# Patient Record
Sex: FEMALE | Race: WHITE
[De-identification: names, ages, dates, MRNs, and addresses within clinical notes are randomized per-mention and may not be internally consistent; named-entity substitution may affect disease eponyms.]

---

## 2017-09-16 ENCOUNTER — HOSPITAL ENCOUNTER (EMERGENCY)
Dept: HOSPITAL 25 - UCCORT | Age: 67
Discharge: HOME | End: 2017-09-16
Payer: MEDICARE

## 2017-09-16 VITALS — SYSTOLIC BLOOD PRESSURE: 124 MMHG | DIASTOLIC BLOOD PRESSURE: 65 MMHG

## 2017-09-16 DIAGNOSIS — J30.9: ICD-10-CM

## 2017-09-16 DIAGNOSIS — E11.9: ICD-10-CM

## 2017-09-16 DIAGNOSIS — Z91.048: ICD-10-CM

## 2017-09-16 DIAGNOSIS — H69.90: ICD-10-CM

## 2017-09-16 DIAGNOSIS — Z88.8: ICD-10-CM

## 2017-09-16 DIAGNOSIS — S50.02XA: Primary | ICD-10-CM

## 2017-09-16 DIAGNOSIS — F41.9: ICD-10-CM

## 2017-09-16 DIAGNOSIS — M25.521: ICD-10-CM
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
## 2017-09-16 PROCEDURE — 99211 OFF/OP EST MAY X REQ PHY/QHP: CPT

## 2017-09-16 PROCEDURE — G0463 HOSPITAL OUTPT CLINIC VISIT: HCPCS

## 2017-09-16 NOTE — UC
Elbow Pain





- HPI Summary


HPI Summary: 


Fell on 9/11/17 onto left elbow. Persistent pain with abrasion and wound 

drainage.


Also c/o ear pain in the last 2 weeks.





- History of Current Complaint


Chief Complaint: UCUpperExtremity


Stated Complaint: LEFT ELBOW SKIN CONCERN


Time Seen by Provider: 09/16/17 14:38


Hx Obtained From: Patient


Hx Last Menstrual Period: n/a


Pregnant?: No


Onset/Duration: Days - 4 days ago, Traumatic - fall onto the left elbow, Still 

Present


Severity Initially: Moderate


Severity Currently: Moderate


Location Of Pain: Is Discrete @ - left elbow


Character: Aching


Aggravating Factor(s): Movement


Alleviating Factor(s): Rest


Associated Signs And Symptoms: Positive: Negative





- Allergies/Home Medications


Allergies/Adverse Reactions: 


 Allergies











Allergy/AdvReac Type Severity Reaction Status Date / Time


 


Adhesive Tape Allergy  ITCHINESS Verified 09/16/17 14:06


 


Pregabalin [From Lyrica] AdvReac Intermediate DEPRESSION Verified 09/16/17 14:06


 


Meloxicam [From Mobic] AdvReac  Nausea And Verified 09/16/17 14:06





   Vomiting  


 


ENVIRONMENTAL Allergy  Unknown Uncoded 09/16/17 14:06





   Reaction  





   Details  











Home Medications: 


 Home Medications





ARIPiprazole TAB* [Abilify 2 MG TAB*] 5 mg DAILY 09/16/17 [History Confirmed 09/ 16/17]


Cholecalciferol [Vitamin D] 3,000 unit DAILY 09/16/17 [History Confirmed 09/16/ 17]


Enalapril TAB* [Vasotec TAB*] 1 tab DAILY 09/16/17 [History Confirmed 09/16/17]


Magnesium [Magnesium Elemental] 1 tab DAILY 09/16/17 [History Confirmed 09/16/17

]


Sertraline* [Zoloft*] 1 tab QAM 09/16/17 [History Confirmed 09/16/17]


clonazePAM TAB(*) [Klonopin TAB(*)] 1 tab TID 09/16/17 [History Confirmed 09/16/ 17]











PMH/Surg Hx/FS Hx/Imm Hx


Endocrine History: Diabetes


Cardiovascular History: Hypertension


Respiratory History: Asthma


Psychological History: Anxiety





- Surgical History


Surgical History: Yes


Surgery Procedure, Year, and Place: C-SECTIONS2-RIGHT HAND CARPAL TUNNEL. 

BILATERAL OVARIES REMOVED.  T&A.  D&Cs.  BILATERAL BREAST LUMPECTOMIES.  LASER 

SX L EYE-FLOATERS.  SURGERIES  IN [place], [place] AND [place]





- Family History


Known Family History: Positive: Cardiac Disease, Hypertension, Respiratory 

Disease


   Negative: Diabetes, Seizure Disorder





- Social History


Occupation: Employed Part-time


Lives: Alone


Alcohol Use: None


Substance Use Type: None


Smoking Status (MU): Never Smoked Tobacco


Have You Smoked in the Last Year: No





- Immunization History


Most Recent Influenza Vaccination: NONE 2017


Most Recent Tetanus Shot: 2010


Most Recent Pneumonia Vaccination: Sep 2017





Review of Systems


ENT: Ear Ache, Nasal Discharge


Musculoskeletal: Arthralgia


Is Patient Immunocompromised?: No


All Other Systems Reviewed And Are Negative: Yes





Physical Exam


Triage Information Reviewed: Yes


Appearance: Well-Appearing, No Pain Distress, Well-Nourished


Vital Signs: 


 Initial Vital Signs











Temp  97.3 F   09/16/17 14:11


 


Pulse  53   09/16/17 14:11


 


Resp  18   09/16/17 14:11


 


BP  124/65   09/16/17 14:11


 


Pulse Ox  97   09/16/17 14:11











Vital Signs Reviewed: Yes


Eyes: Positive: Conjunctiva Clear


ENT: Positive: Pharynx normal, Nasal congestion, TMs normal


Neck exam: Normal


Respiratory Exam: Normal


Cardiovascular Exam: Normal


Musculoskeletal Exam: Normal


Neurological Exam: Normal


Psychological Exam: Normal


Skin: Positive: Other - abrasion left extensor elbow.





Elbow Pain Course/Dx





- Differential Dx/Diagnosis


Differential Diagnosis/HQI/PQRI: Abrasion, Contusion, Fracture (Closed)


Provider Diagnoses: abrasion left arm.  Contusion left elbow.  Allergic 

rhinitis.  Eustachian tube dysfunction.





Discharge





- Discharge Plan


Condition: Stable


Disposition: HOME


Patient Education Materials:  Abrasion (ED), Contusion in Adults (ED), Allergic 

Rhinitis (ED)


Additional Instructions: 


NEILMED SINUS RINSE: CHECK OUT AT [email or web address]





Saline nasal wash helps with mucous, allergies and congestion. It can be used 

up to twice a day or only as needed.


Use lukewarm tap water. It does not have to be sterilized or distilled water.


Do 1/3 on each side and snort out of both nostrils. Repeat the process with 1/6 

of the bottle on each side with snorting in between to finish the solution in 

the bottle

## 2018-06-04 ENCOUNTER — HOSPITAL ENCOUNTER (EMERGENCY)
Dept: HOSPITAL 25 - UCCORT | Age: 68
Discharge: HOME | End: 2018-06-04
Payer: MEDICARE

## 2018-06-04 VITALS — DIASTOLIC BLOOD PRESSURE: 68 MMHG | SYSTOLIC BLOOD PRESSURE: 128 MMHG

## 2018-06-04 DIAGNOSIS — M19.90: ICD-10-CM

## 2018-06-04 DIAGNOSIS — J44.9: ICD-10-CM

## 2018-06-04 DIAGNOSIS — Z95.0: ICD-10-CM

## 2018-06-04 DIAGNOSIS — K21.9: ICD-10-CM

## 2018-06-04 DIAGNOSIS — M81.0: ICD-10-CM

## 2018-06-04 DIAGNOSIS — I10: ICD-10-CM

## 2018-06-04 DIAGNOSIS — R60.0: Primary | ICD-10-CM

## 2018-06-04 PROCEDURE — G0463 HOSPITAL OUTPT CLINIC VISIT: HCPCS

## 2018-06-04 PROCEDURE — 99212 OFFICE O/P EST SF 10 MIN: CPT

## 2018-06-04 NOTE — ED
Lower Extremity





- HPI Summary


HPI Summary: 





67 yr old female with the complaint of right lower extremity swelling, itching.

  The patient banged her anterior right lower leg on a cart at [employer] two weeks 

ago, and over the past week has experienced swelling and itching to the lower 

leg, and at times has redness to the lower anterior leg.  No fever, chills.  No 

other complaints.





- History of Current Complaint


Chief Complaint: UCSkin


Stated Complaint: SKIN COMPLAINT - RT LEG


Time Seen by Provider: 06/04/18 17:03


Hx Last Menstrual Period: n/a


Pain Intensity: 0





- Allergies/Home Medications


Allergies/Adverse Reactions: 


 Allergies











Allergy/AdvReac Type Severity Reaction Status Date / Time


 


Adhesive Tape Allergy  ITCHINESS Verified 09/16/17 14:06


 


MS Pregabalin [From Lyrica] AdvReac Intermediate DEPRESSION Verified 09/16/17 14

:06


 


MS Meloxicam [From Mobic] AdvReac  Nausea And Verified 09/16/17 14:06





   Vomiting  


 


ENVIRONMENTAL Allergy  Unknown Uncoded 09/16/17 14:06





   Reaction  





   Details  











Home Medications: 


 Home Medications





Mirtazapine TAB* [Remeron TAB*] 30 mg PO BEDTIME 06/04/18 [History Confirmed 06/ 04/18]


Pantoprazole TAB (NF) [Protonix TAB (NF)] 40 mg PO DAILY 06/04/18 [History 

Confirmed 06/04/18]


Venlafaxine ER (NF) [Effexor ER (NF)] 150 mg PO BID 06/04/18 [History Confirmed 

06/04/18]











PMH/Surg Hx/FS Hx/Imm Hx


Endocrine/Hematology History: Reports: Hx Diabetes - h/o being pre-diabetic, Hx 

Sickle Cell Disease - THALACEMIA MINOR


Cardiovascular History: Reports: Hx Hypertension


   Denies: Hx Cardiomegaly, Hx Pacemaker/ICD


Respiratory History: Reports: Hx Asthma, Hx Chronic Obstructive Pulmonary 

Disease (COPD), Hx Sleep Apnea - MILD-MODERATE- NO MACHINE


GI History: Reports: Hx Gastroesophageal Reflux Disease - ON MEDICATION FOR, 

Other GI Disorders - GASTROPARESIS


 History: 


   Denies: Hx Renal Disease


Musculoskeletal History: Reports: Hx Arthritis - OSTEOARTHRITIS, Other 

Musculoskeletal History - OSTEOPOROSIS


Sensory History: Reports: Hx Cataracts, Hx Contacts or Glasses - GLASSES


   Denies: Hx Hearing Aid


Opthamlomology History: Reports: Hx Cataracts, Hx Contacts or Glasses - GLASSES


Neurological History: Reports: Hx Migraine - HX OF - NONE RECENTLY


Psychiatric History: Reports: Hx Anxiety - ON MEDICATION FOR, Hx Panic Disorder 

- ANXIETY





- Surgical History


Surgery Procedure, Year, and Place: C-SECTIONS2-RIGHT HAND CARPAL TUNNEL. 

BILATERAL OVARIES REMOVED.  T&A.  D&Cs.  BILATERAL BREAST LUMPECTOMIES.  LASER 

SX L EYE-FLOATERS.  SURGERIES  IN [place], [place] AND [place]


Hx Anesthesia Reactions: Yes - GAGGIN ON SOMETHING THEY HAD DOWN HER THROAT


Infectious Disease History: No


Infectious Disease History: Reports: Hx Shingles - 2014


   Denies: Hx Clostridium Difficile, Hx Hepatitis, Hx Human Immunodeficiency 

Virus (HIV), Hx Tuberculosis, Traveled Outside the  in Last 30 Days





- Family History


Known Family History: Positive: Cardiac Disease, Hypertension, Respiratory 

Disease


   Negative: Diabetes, Seizure Disorder





- Social History


Alcohol Use: None


Substance Use Type: Reports: None


Smoking Status (MU): Never Smoked Tobacco


Have You Smoked in the Last Year: No





Review of Systems


Constitutional: Negative


Negative: Chest Pain


Negative: Shortness Of Breath


Positive: Edema - edema right lower extremity.


All Other Systems Reviewed And Are Negative: Yes





Physical Exam


Triage Information Reviewed: Yes


Vital Signs On Initial Exam: 


 Initial Vitals











Temp Pulse Resp BP Pulse Ox


 


 98.6 F   82   16   128/68   96 


 


 06/04/18 17:04  06/04/18 17:04  06/04/18 17:04  06/04/18 17:04  06/04/18 17:04











Vital Signs Reviewed: Yes


Appearance: Positive: Well-Appearing, No Pain Distress


Skin: Positive: Other - mild redness right lower leg, and a small abrasion 

anterior lower leg right


Head/Face: Positive: Normal Head/Face Inspection


Eyes: Positive: EOMI


ENT: Positive: Normal ENT inspection


Respiratory/Lung Sounds: Positive: Clear to Auscultation, Breath Sounds Present


Cardiovascular: Positive: RRR.  Negative: Murmur


Abdomen Description: Positive: Nontender


Musculoskeletal: Positive: Strength/ROM Intact, Edema Right


Neurological: Positive: Sensory/Motor Intact, Alert, Oriented to Person Place, 

Time, CN Intact II-III


Psychiatric: Positive: Normal





- Bruce Coma Scale


Best Eye Response: 4 - Spontaneous


Best Motor Response: 6 - Obeys Commands


Best Verbal Response: 5 - Oriented


Coma Scale Total: 15





Diagnostics





- Vital Signs


 Vital Signs











  Temp Pulse Resp BP Pulse Ox


 


 06/04/18 17:04  98.6 F  82  16  128/68  96














- Laboratory


Lab Statement: Any lab studies that have been ordered have been reviewed, and 

results considered in the medical decision making process.





Lower Extremity Course/Dx





- Course


Course Of Treatment: 67 yr old female with right leg edema, small abrasion.  

She is going to the ER for further eval to rule out DVT>





- Diagnoses


Provider Diagnoses: 


 Leg edema, right








Discharge





- Sign-Out/Discharge


Documenting (check all that apply): Discharge/Admit/Transfer





- Discharge Plan


Condition: Good


Disposition: HOME


Patient Education Materials:  Leg Edema (ED)


Referrals: 


Trixie Albert MD [Primary Care Provider] - 


Additional Instructions: 


[facility]: Emergency Room 


[place] in [place] 


DirectionsWebsite 


Address: [address] [place] [address] 


Phone: [phone]





Please go to the ER from here to be sure you do not have a blood clot in your 

leg.





- Billing Disposition and Condition


Condition: GOOD


Disposition: Home

## 2018-09-26 ENCOUNTER — HOSPITAL ENCOUNTER (EMERGENCY)
Dept: HOSPITAL 25 - UCCORT | Age: 68
Discharge: HOME | End: 2018-09-26
Payer: MEDICARE

## 2018-09-26 VITALS — SYSTOLIC BLOOD PRESSURE: 145 MMHG | DIASTOLIC BLOOD PRESSURE: 64 MMHG

## 2018-09-26 DIAGNOSIS — R21: Primary | ICD-10-CM

## 2018-09-26 DIAGNOSIS — Z91.048: ICD-10-CM

## 2018-09-26 DIAGNOSIS — Z88.8: ICD-10-CM

## 2018-09-26 PROCEDURE — G0463 HOSPITAL OUTPT CLINIC VISIT: HCPCS

## 2018-09-26 PROCEDURE — 99211 OFF/OP EST MAY X REQ PHY/QHP: CPT

## 2018-09-26 NOTE — XMS REPORT
Grace Ortolani

 Created on:2018



Patient:Ortolani, Grace

Sex:Female

:1950

External Reference #:[id number]





Demographics







 Address  [address]



   [address]



   [place] [address]

 

 Mobile Phone  [phone]

 

 Preferred Language  English

 

 Marital Status  Not  Or 

 

 Moravian Affiliation  Unknown

 

 Race  White

 

 Ethnic Group  Not  Or 









Author







 Organization  [facility]

 

 Address  [address] [place] [address]

 

 Phone  [phone]









Support







 Name  Relationship  Address  Phone

 

 Shelli Kenney  Daughter  Unavailable  [phone]









Care Team Providers







 Name  Role  Phone

 

 Trixie Albert M.D.  Primary Care Physician  Unavailable









Payers







 Type  Date  Identification Numbers  Payment Provider  Subscriber

 

 Medicare Primary    Policy Number: [id number]  Medicare-National  Grace Ortolani



       [employer]  









 PayID: [id number]  [address] [address]









 [facility]    Policy Number: [id number]  [facility]  Grace Ortolani









 PayID: [id number]







Problems







 Date  Description  Provider  Status

 

 Onset: 2018  Allergy to other foods  Khang Jaime M.D.  Active

 

 Onset: 2018  Idiopathic urticaria  Khang Jaime M.D.  Active

 

 Onset: 2018  Atopic dermatitis  Khang Jaime M.D.  Active

 

 Onset: 2018  Immunization  Khang Jaime M.D.  Active







Family History







 Date  Family Member(s)  Problem(s)  Comments

 

   General  Diabetes  

 

   General  Heart Disease  

 

   General  Hypertension  

 

   Father  Diabetes  

 

   Father  Heart Disease  

 

   Father  Hypertension  

 

   Mother  Diabetes  

 

   Mother  Heart Disease  







Social History







 Type  Date  Description  Comments

 

 Home Environment    Does not use air   

 

 Home Environment    Has a window air conditioner  

 

 Home Environment    Stairs are not present  

 

 Home Environment    The basement is dry  

 

 Home Environment    Unfinished Basement  

 

 Home Environment    [employer]  

 

 Home Environment    Rubber Mattress  

 

 Home Environment    Mattress is over 10 years old  

 

 Home Environment    Pillows are polyester  

 

 Home Environment    Pillows contain feathers  

 

 Home Environment    Does not use a dehumidifier  

 

 Home Environment    Pillows are encased in an allergy proof case  

 

 Home Environment    Mattress is encased in an allergy proof case  

 

 Home Environment    There are no draperies in the home  

 

 Home Environment    The home is not clint  

 

 Home Environment    The floors are wood  

 

 Home Environment    Uses baseboard heating  

 

 Home Environment    Lives in an older 1st floor apartment in the [facility]  

 

 Home Environment    Water Source: [facility]  

 

 Smoke-Free    Home is smoke-free  

 

 Pets    1 cat  

 

 Pets    Animals sleep in bedroom  

 

 Occupation    Retired  

 

 ETOH Use    Denies alcohol use  

 

 Smoking    Patient has never smoked  

 

 Recreational Drug Use    Denies Drug Use  







Allergies, Adverse Reactions, Alerts







 Date  Description  Reaction  Status  Severity  Comments

 

 2018  Lurasidone    active    ssal thought

 

 2018  Mobic    active    nausea

 

 2018  Lunesta    active    cant recall reaction







Medications







 Medication  Date  Status  Form  Strength  Qnty  SIG  Indications  Ordering



                 Provider

 

 Clobetasol  /  Active  Cream  0.05%  45gm  apply  L20.9  Khang EASTMAN



 Propionate  2018          sparingly    Jaime,



             to affected    M.D.



             areas 2    



             times daily    



             as needed,    



             do not    



             apply to    



             face or    



             genital    



             area    

 

 Mirtazapine  /  Active  Tablets  15mg    once a day    Unknown



   0000              

 

 Pantoprazole  /  Active  Tablets  40mg    1 tab    Unknown



 Sodium  0000    DR      daily.    



             before    



             meals    

 

 Venlafaxine HCL  /  Active  Caps ER  150mg    twice a day    Unknown



 ER  0000    24HR          

 

 Clobetasol  /  Active  Cream  0.05%    Apply    Unknown



 Propionate  0000          Sparingly    



             To Affected    



             Areas On    



             Arms And    



             Legs Twice    



             Daily    

 

 Trazodone HCL  /  Active  Tablets  50mg    1 tab at    Unknown



   0000          night as    



             needed.    

 

 Clonazepam  00/  Active  Tablets  1mg    1 time a    Unknown



   0000          day    

 

 Ventolin HFA  00/  Active  Aerosol  108(90Base    inhale 1    Unknown



   0000      ) mcg/Act    puff by    



             mouth every    



             4 hours if    



             needed    







Medications Administered in Office







 Medication  Date  Status  Form  Strength  Qnty  SIG  Indications  Ordering



                 Provider

 

 Celestone/Matt    Administered  Injection          Khang EASTMAN



 isone  018              Jaime,



 [id number] 1                M.D.



 cc                







Immunizations







 CPT Code  Status  Date  Vaccine  Lot #

 

 [id number]  Given  Unknown  Influenza Vaccine  







Vital Signs







 Date  Vital  Result  Comment

 

 2018  Height  58 inches  4'10"









 Weight  185.00 lb  

 

 Weight in kg's  83.916  

 

 Respiratory Rate  20 /min  

 

 Heart Rate  78 /min  

 

 O2 % BldC Oximetry  94 %  

 

 BP Systolic  123 mmHg  

 

 BP Diastolic  40 mmHg  

 

 Asthma Control Test  23  

 

 BMI (Body Mass Index)  38.7 kg/m2  







Results







 Test  Date  Test  Result  H/L  Range  Note

 

 Laboratory test finding  2018  Peanut  <0.10 kU/L    Class 0  1, 2









 [place]  <0.10 kU/L    Class 0  1

 

 Pecan Nut F201  <0.10 kU/L    Class 0  1

 

 Cashew Nut F202  <0.10 kU/L    Class 0  1

 

 Hazelnut (Filbert) F017 Ige  <0.10 kU/L    Class 0  1

 

 [place],Food F256  <0.10 kU/L    Class 0  1, 3









 Laboratory test finding  2018  Tryptase  2.8 ug/L    2.2-13.2  1

 

 CBC With Auto Diff  2018  White Blood Count  6.7 K/uL    3.1-10.7  1









 Red Blood Count  5.07 M/uL    3.90-5.40  1

 

 Hemoglobin  10.7 gm/dL  Low  11.6-15.8  1

 

 Hematocrit  33.3 %  Low  36.0-46.1  1

 

 Mean Cell Volume  65.7 fl  Low  80.9-99.0  1

 

 Mean Corpuscular HGB  21.1 pg  Low  25.9-32.7  1

 

 Mean Corpuscular HGB Conc  32.1 g/dL    30.8-34.3  1

 

 Platelet Count  154 K/uL  Low  155-360  1, 4

 

 Red Cell Distri Width SD  38.2 fl    3-47  1

 

 Red Cell Distri Width %CV  16.7 %  High  11.7-14.4  1

 

 Mean Platelet Volume  10.1 fL    8.9-12.4  1

 

 Neut%  78.2 %  High  40.4-72.8  1

 

 Lymph %  12.6 %  Low  20.0-42.0  1

 

 Mono %  5.0 %    4.3-13.2  1

 

 Eo%  3.9 %    0.0-6.6  1

 

 Bas%  0.3 %    0.0-1.1  1

 

 Neut#  5.20 K/uL    1.8-7.0  1

 

 Lymph #  0.84 K/uL  Low  1.0-4.0  1

 

 Mono #  0.33 K/uL    0.3-0.9  1

 

 Eos #  0.26 K/uL    0.0-0.5  1

 

 Baso #  0.02 K/uL    0.0-0.1  1









 Vitamin D 25 Hydrox  2018  Vitamin D,25-Hydroxy  24.3 ng/mL  Low  30.0-
100.0  1, 5

 

 Thyroglobulin Panel  2018  Thyroglobulin Antibody  < 1.0 IU/mL    0.0-
0.9  1, 6









 Thyroglobulin By Abigail  2.0 ng/mL    1.5-38.5  1, 7









 Laboratory test finding  2018  Milk (Cow)  <0.10 kU/L    Class 0  1









 Egg White  <0.10 kU/L    Class 0  1

 

 Egg (Yolk)  <0.10 kU/L    Class 0  1

 

 Wheat  <0.10 kU/L    Class 0  1

 

 Codfish  <0.10 kU/L    Class 0  1

 

 Shrimp  <0.10 kU/L    Class 0  1

 

 Ige Total  <pending>      1









 Liver Function Tests  2018  Total Protein  7.4 g/dL    6.4-8.2  1









 Albumin  3.6 g/dL    3.4-5.0  1

 

 Globulin  3.8 g/dL    1.9-4.3  1

 

 Alb/Glob  0.9 ratio      1

 

 Bilirubin,Total  0.6 mg/dL    0.2-1.0  1

 

 Bilirubin,Direct  0.1 mg/dL    0.0-0.2  1

 

 Bilirubin,Indirect  0.5 mg/dL    0.0-0.9  1

 

 Sgot/Ast  23 U/L    15-37  1

 

 SGPT/Alt  29 U/L    12-78  1

 

 Alkaline Phosphatase  109 U/L      1









 Laboratory test finding  2018  Thyroxine (T4)  8.4 g/dL    4.7-13.3  1









 Thyroid Stim Hormone  0.86 uIU/mL    0.30-4.20  1









 Slide Review  2018  Slide Review  DIFF ORDERED      1, 8

 

 Path Review:  2018  Path Review:  .      1, 9

 

 Differential-WBC Confirm  2018  Total Cells Counted  100 #CELLS      1









 Band%  1 %    0-8  1

 

 Neutrophils%  79 %  High  33-73  1

 

 Lymph%  8 %  Low  20-42  1

 

 Atypical Lymph%  3 %    0-7  1

 

 Monocyte%  5 %    0-10  1

 

 Eosinophil%  3 %    0-5  1

 

 Basophil%  1 %    0-2  1

 

 Platelet Estimate  NORMAL      1, 10

 

 Polychromasia  0-1+      1

 

 Anisocytosis  1+      1

 

 Microcytosis  2+      1

 

 Basophilic Stippling  0-1+      1

 

 Elliptocytes  1+      1

 

 Differential Comment  LARGE PLATELETS  <SEE NOTE>      1, 11









 Laboratory test finding  2018  Immunoglobulin E,Total  118 IU/mL  High  0
-100  1









 1  Z91.018 L50.1 E55.9 E07.89

 

 2  Levels of Specific IgE       Class  Description of Class



   ---------------------------  -----  --------------------



   < 0.10         0         Negative



   0.10 -    0.31         0/I       Equivocal/Low



   0.32 -    0.55         I         Low



   0.56 -    1.40         II        Moderate



   1.41 -    3.90         III       High



   3.91 -   19.00         IV        Very High



   19.01 -  100.00         V         Very High



   >100.00         VI        Very High

 

 3  Performed at:  [address]  [id number]



   : Lopez Rios MD, Phone:  [phone]



   Performed at:  [address]  [id number]



   : Araceli B Reyes MD, Phone:  [phone]

 

 4  Result confirmed by repeat analysis.

 

 5  Vitamin D deficiency has been defined by the [place] of



   Medicine and an Endocrine Society practice guideline as a



   level of serum 25-OH vitamin D less than 20 ng/mL (1,2).



   The Endocrine Society went on to further define vitamin D



   insufficiency as a level between 21 and 29 ng/mL (2).



   1. IOM ([place] of Medicine). 2010. Dietary reference



   intakes for calcium and D. [place]: The



   National Academies Press.



   2. Jordan MF, Emily NC, Elias ESCAMILLA, et al.



   Evaluation, treatment, and prevention of vitamin D



   deficiency: an Endocrine Society clinical practice



   guideline. JCEM. 2011; 96(7):1911-30.



   Performed at:  [facility] [address]  [id number]



   : Araceli B Reyes MD, Phone:  [phone]

 

 6  Thyroglobulin Antibody measured by [facility] [place]



   Methodology

 

 7  According to the National Academy of Clinical Biochemistry,



   the reference interval for Thyroglobulin (TG) should be



   related to euthyroid patients and not for patients who



   underwent thyroidectomy. TG reference intervals for these



   patients depend on the residual mass of the thyroid tissue



   left after surgery. Establishing a post-operative baseline



   is recommended. The assay limit of quantitation is 0.1



   ng/mL



   Thyroglobulin measured by [facility]



   Assay

 

 8  Instrument flagged sample for slide review.



   Less than 10% Bands seen, no other immature WBC's seen.



   RBC morphology essentially normal.



   Platelet estimate=

 

 9  Reviewed previous history, path review not indicated

 

 10  CHECKED,PLT EST. AGREES WITH INSTRUMENT VALUE.

 

 11  LARGE PLATELETS PRESENT.







Procedures







 Description

 

 No Information







Plan of Care

Future Appointment(s):2018  1:40 pm - ANGELINA Norris at [facility]2018 - Khang Jaime M.D.L20.9 Atopic dermatitis, 
bkifizjzizeZ88.018 Allergy to other pcwsgS99.1 Idiopathic ydazoxlaaA24.9 
Vitamin D deficiency, tcjzgmrtclnF52.89 Other specified disorders of fvxqftwT26 
Encounter for immunizationNew Medication:Clobetasol Propionate 0.05 %Comments:
Our assessments and recommendations were discussed with the patient or patient'
s family who expressed understanding and agreement with the treatment plan. Our 
assessments and recommendations were discussed with the patient or patient's 
family who expressed understanding and agreement with the treatment 
plan.Recommendations:Patient to discuss a pneumococcal vaccination with their 
primary-care provider. Educational materials provided to the patient.

## 2018-09-26 NOTE — UC
UC General HPI





- HPI Summary


HPI Summary: 





pt notes a red, warm, hard area on her R upper arm today. she got a flu shot 

about 4 days ago and thinks this may be a reaction.


no hx injury, fever or joint pain.


has hx of a chronic itcy rash and is to take antihistamines for it plus is 

seeing an allergist for it.





i called pt's pharmacy. she got her flu shot on 9/20/18 in her left arm 

according to the documentation.





- History of Current Complaint


Chief Complaint: UCSkin


Stated Complaint: RASH RIGHT ARM


Time Seen by Provider: 09/26/18 18:47


Hx Obtained From: Patient


Hx Last Menstrual Period: n/a


Pain Intensity: 10


Associated Signs & Symptoms: Negative: Fever





- Allergy/Home Medications


Allergies/Adverse Reactions: 


 Allergies











Allergy/AdvReac Type Severity Reaction Status Date / Time


 


Adhesive Tape Allergy  ITCHINESS Verified 09/26/18 18:28


 


MS Pregabalin [From Lyrica] AdvReac Intermediate DEPRESSION Verified 09/26/18 18

:28


 


MS Meloxicam [From Mobic] AdvReac  Nausea And Verified 09/26/18 18:28





   Vomiting  


 


ENVIRONMENTAL Allergy  Unknown Uncoded 09/26/18 18:28





   Reaction  





   Details  











Home Medications: 


 Home Medications





Betamethasone Dip 0.05% ON(NF) [Betamethasone Dipr 0.05% OINT(NF)] 1 applic 

.SEE ORDER 09/26/18 [History]











PMH/Surg Hx/FS Hx/Imm Hx





- Additional Past Medical History


Additional PMH: 





chronic rash


Respiratory History: Asthma


GI/ History: Gastroesophageal Reflux


Psychological History: Anxiety, Depression





- Surgical History


Surgical History: Yes


Surgery Procedure, Year, and Place: C-SECTIONS2-RIGHT HAND CARPAL TUNNEL. 

BILATERAL OVARIES REMOVED.  T&A.  D&Cs.  BILATERAL BREAST LUMPECTOMIES.  LASER 

SX L EYE-FLOATERS.  SURGERIES  IN [place], [facility]





- Family History


Known Family History: Positive: Cardiac Disease, Hypertension, Respiratory 

Disease


   Negative: Diabetes, Seizure Disorder





- Social History


Lives: Alone


Alcohol Use: None


Substance Use Type: None


Smoking Status (MU): Never Smoked Tobacco


Have You Smoked in the Last Year: No





- Immunization History


Most Recent Influenza Vaccination: NONE 2017


Most Recent Tetanus Shot: 2010


Most Recent Pneumonia Vaccination: Sep 2017


Vaccination Up to Date: Yes





Review of Systems


Constitutional: Negative


Skin: Rash - R upper arm


Eyes: Negative


ENT: Negative


Respiratory: Negative


Cardiovascular: Negative


Gastrointestinal: Negative


Genitourinary: Negative


Motor: Negative


Neurovascular: Negative


Musculoskeletal: Negative


Neurological: Negative


Psychological: Negative


Is Patient Immunocompromised?: No


All Other Systems Reviewed And Are Negative: Yes





Physical Exam


Triage Information Reviewed: Yes


Appearance: Well-Appearing


Vital Signs: 


 Initial Vital Signs











Temp  98.7 F   09/26/18 18:19


 


Pulse  85   09/26/18 18:19


 


Resp  18   09/26/18 18:19


 


BP  145/64   09/26/18 18:19


 


Pulse Ox  98   09/26/18 18:19











Vital Signs Reviewed: Yes


Eyes: Positive: Conjunctiva Clear


ENT: Positive: Pharynx normal, TMs normal.  Negative: Nasal congestion, Nasal 

drainage


Neck: Positive: Supple, Nontender, No Lymphadenopathy


Respiratory: Positive: Lungs clear, Normal breath sounds


Cardiovascular: Positive: RRR, No Murmur


Abdomen Description: Positive: Nontender, No Organomegaly, Soft


Bowel Sounds: Positive: Present


Musculoskeletal: Positive: ROM Intact


Neurological: Positive: Alert


Psychological: Positive: Age Appropriate Behavior


Skin Exam: Normal, Other - There is a welt over the R posterior deltoid area. 

In the center is a spot that looks concerning for an insect bite. No bruising, 

blistering or peeling. The area is excoriated.  Additional skin exam shows 

multiple areas of excoriation.





Course/Dx





- Course


Course Of Treatment: flu shot in L deltoid 6 days ago thus not c/w a local 

reaction. the acute rash looks most c/w a possible insect bite. pt is to take 

her antihistamines for the chronic rash which would help the acute local 

reaction.  based on this exam, there is no concern for cellulitis.  pt has been 

instructed to seek close f/u for a recheck and go to the ER for worsening.





- Differential Dx - Multi-Symptom


Provider Diagnoses: Acute rash R deltoid.





Discharge





- Sign-Out/Discharge


Documenting (check all that apply): Patient Departure


All imaging exams completed and their final reports reviewed: No Studies





- Discharge Plan


Condition: Stable


Disposition: HOME


Patient Education Materials:  Acute Rash (ED)


Referrals: 


Trixie Albert MD [Primary Care Provider] - 2 Days


Additional Instructions: 


TAKE THE ANTIHISTAMINES AS DIRECTED.





GO TO THE ER FOR ANY WORSENING.





- Billing Disposition and Condition


Condition: STABLE


Disposition: Home

## 2018-09-26 NOTE — XMS REPORT
Grace Ortolani

 Created on:2018



Patient:Ortolani, Grace

Sex:Female

:1950

External Reference #:[id number]





Demographics







 Address  [address]



   [address]



   [place] [address]

 

 Mobile Phone  [phone]

 

 Preferred Language  English

 

 Marital Status  Not  Or 

 

 Scientologist Affiliation  Unknown

 

 Race  White

 

 Ethnic Group  Not  Or 









Author







 Organization  [facility]

 

 Address  [address] [place] [address]

 

 Phone  [phone]









Support







 Name  Relationship  Address  Phone

 

 Shelli Kenney  Daughter  Unavailable  [phone]









Care Team Providers







 Name  Role  Phone

 

 Trixie Albert M.D.  Primary Care Physician  Unavailable









Payers







 Type  Date  Identification Numbers  Payment Provider  Subscriber

 

 Medicare Primary    Policy Number: [id number]  Medicare-National  Grace Ortolani



       [employer]  









 PayID: [id number]  [address] [address]









 [facility]    Policy Number: [id number]  [facility]  Grace Ortolani









 PayID: [id number]







Problems







 Date  Description  Provider  Status

 

 Onset: 2018  Allergy to other foods  Khang Jaime M.D.  Active

 

 Onset: 2018  Idiopathic urticaria  Khang Jaime M.D.  Active

 

 Onset: 2018  Atopic dermatitis  Khang Jaime M.D.  Active

 

 Onset: 2018  Immunization  Khang Jaime M.D.  Active







Family History







 Date  Family Member(s)  Problem(s)  Comments

 

   General  Diabetes  

 

   General  Heart Disease  

 

   General  Hypertension  

 

   Father  Diabetes  

 

   Father  Heart Disease  

 

   Father  Hypertension  

 

   Mother  Diabetes  

 

   Mother  Heart Disease  







Social History







 Type  Date  Description  Comments

 

 Home Environment    Does not use air   

 

 Home Environment    Has a window air conditioner  

 

 Home Environment    Stairs are not present  

 

 Home Environment    The basement is dry  

 

 Home Environment    Unfinished Basement  

 

 Home Environment    [employer]  

 

 Home Environment    Rubber Mattress  

 

 Home Environment    Mattress is over 10 years old  

 

 Home Environment    Pillows are polyester  

 

 Home Environment    Pillows contain feathers  

 

 Home Environment    Does not use a dehumidifier  

 

 Home Environment    Pillows are encased in an allergy proof case  

 

 Home Environment    Mattress is encased in an allergy proof case  

 

 Home Environment    There are no draperies in the home  

 

 Home Environment    The home is not clint  

 

 Home Environment    The floors are wood  

 

 Home Environment    Uses baseboard heating  

 

 Home Environment    Lives in an older 1st floor apartment in the [facility]  

 

 Home Environment    Water Source: [facility]  

 

 Smoke-Free    Home is smoke-free  

 

 Pets    1 cat  

 

 Pets    Animals sleep in bedroom  

 

 Occupation    Retired  

 

 ETOH Use    Denies alcohol use  

 

 Smoking    Patient has never smoked  

 

 Recreational Drug Use    Denies Drug Use  







Allergies, Adverse Reactions, Alerts







 Date  Description  Reaction  Status  Severity  Comments

 

 2018  Lurasidone    active    ssal thought

 

 2018  Mobic    active    nausea

 

 2018  Lunesta    active    cant recall reaction







Medications







 Medication  Date  Status  Form  Strength  Qnty  SIG  Indications  Ordering



                 Provider

 

 Permethrin  /  Active  Cream  5%  60gm  follow    [facility]



             package    Tom,



             instrution.    FNP-C

 

 Hydroxyzine  /  Active  Tablets  25mg  30tabs  [facility]  [facility]          [facility] as    Tom,



             needed for    FNP-C



             itching.    

 

 Clobetasol  /  Active  Cream  0.05%  45gm  apply  L20.9  Khang W.



 Propionate  2018          sparingly to    Jaime,



             affected    M.D.



             areas 2    



             times daily    



             as needed,    



             do not apply    



             to face or    



             genital area    

 

 Mirtazapine  /  Active  Tablets  15mg    once a day    Unknown



   0000              

 

 Pantoprazole  /  Active  Tablets  40mg    1 tab daily.    Unknown



 Sodium  0000    DR      before meals    

 

 Venlafaxine  /  Active  Caps ER  150mg    twice a day    Unknown



 HCL ER  0000    24HR          

 

 Trazodone HCL  /  Active  Tablets  50mg    1 tab at    Unknown



   0000          night as    



             needed.    

 

 Clonazepam  /  Active  Tablets  1mg    1 time a day    Unknown



   0000              

 

 Ventolin HFA  /  Active  Aerosol  108(90Base    inhale 1    Unknown



   0000      ) mcg/Act    puff by    



             mouth every    



             4 hours if    



             needed    

 

                 

 

 Permethrin  /  Hx  Liquid          [facility]



    -              Tom,



   /              FNP-C



                 

 

 Permethrin  /  Hx  Cream  5%        [facility]



    -              Tom,



                 FNP-C



                 

 

 Permethrin  /  Hx  Liquid  1%        [facility]



    -              Tom,



                 FNP-C



                 

 

 Permethrin  /  Hx  Lotion  1%  1Bottl  follow    [facility]



   2018 -        e  package    Tom,



   /          instruction    FNP-C



   2018              

 

 Cosmocil CQ  /  Hx  Liquid          [facility]



   2018 -              Tom,



   /              FNP-C



   2018              







Medications Administered in Office







 Medication  Date  Status  Form  Strength  Qnty  SIG  Indications  Ordering



                 Provider

 

 Isaias    Administered  Injection          Khang Jaime,



 [id number] 1                M.DBarbara



 cc                







Immunizations







 CPT Code  Status  Date  Vaccine  Lot #

 

 [id number]  Given  Unknown  Influenza Vaccine  







Vital Signs







 Date  Vital  Result  Comment

 

 2018  Height  58 inches  4'10"









 Weight  184.00 lb  

 

 Weight in kg's  83.462  

 

 Respiratory Rate  20 /min  

 

 Heart Rate  97 /min  

 

 O2 % BldC Oximetry  97 %  

 

 BP Systolic  140 mmHg  

 

 BP Diastolic  64 mmHg  

 

 Asthma Control Test  21  

 

 BMI (Body Mass Index)  38.5 kg/m2  









 2018  Height  58 inches  4'10"









 Weight  185.00 lb  

 

 Weight in kg's  83.916  

 

 Respiratory Rate  20 /min  

 

 Heart Rate  78 /min  

 

 O2 % BldC Oximetry  94 %  

 

 BP Systolic  123 mmHg  

 

 BP Diastolic  40 mmHg  

 

 Asthma Control Test  23  

 

 BMI (Body Mass Index)  38.7 kg/m2  







Results







 Test  Date  Test  Result  H/L  Range  Note

 

 Laboratory test finding  2018  Peanut  <0.10 kU/L    Class 0  1, 2









 [place]  <0.10 kU/L    Class 0  1

 

 Pecan Nut F201  <0.10 kU/L    Class 0  1

 

 Cashew Nut F202  <0.10 kU/L    Class 0  1

 

 Hazelnut (Filbert) F017 Ige  <0.10 kU/L    Class 0  1

 

 [place],Food F256  <0.10 kU/L    Class 0  1, 3









 Laboratory test finding  2018  Tryptase  2.8 ug/L    2.2-13.2  1

 

 CBC With Auto Diff  2018  White Blood Count  6.7 K/uL    3.1-10.7  1









 Red Blood Count  5.07 M/uL    3.90-5.40  1

 

 Hemoglobin  10.7 gm/dL  Low  11.6-15.8  1

 

 Hematocrit  33.3 %  Low  36.0-46.1  1

 

 Mean Cell Volume  65.7 fl  Low  80.9-99.0  1

 

 Mean Corpuscular HGB  21.1 pg  Low  25.9-32.7  1

 

 Mean Corpuscular HGB Conc  32.1 g/dL    30.8-34.3  1

 

 Platelet Count  154 K/uL  Low  155-360  1, 4

 

 Red Cell Distri Width SD  38.2 fl    3-47  1

 

 Red Cell Distri Width %CV  16.7 %  High  11.7-14.4  1

 

 Mean Platelet Volume  10.1 fL    8.9-12.4  1

 

 Neut%  78.2 %  High  40.4-72.8  1

 

 Lymph %  12.6 %  Low  20.0-42.0  1

 

 Mono %  5.0 %    4.3-13.2  1

 

 Eo%  3.9 %    0.0-6.6  1

 

 Bas%  0.3 %    0.0-1.1  1

 

 Neut#  5.20 K/uL    1.8-7.0  1

 

 Lymph #  0.84 K/uL  Low  1.0-4.0  1

 

 Mono #  0.33 K/uL    0.3-0.9  1

 

 Eos #  0.26 K/uL    0.0-0.5  1

 

 Baso #  0.02 K/uL    0.0-0.1  1









 Vitamin D 25 Hydrox  2018  Vitamin D,25-Hydroxy  24.3 ng/mL  Low  30.0-
100.0  1, 5

 

 Thyroglobulin Panel  2018  Thyroglobulin Antibody  < 1.0 IU/mL    0.0-
0.9  1, 6









 Thyroglobulin By Abigail  2.0 ng/mL    1.5-38.5  1, 7









 Laboratory test finding  2018  Milk (Cow)  <0.10 kU/L    Class 0  1









 Egg White  <0.10 kU/L    Class 0  1

 

 Egg (Yolk)  <0.10 kU/L    Class 0  1

 

 Wheat  <0.10 kU/L    Class 0  1

 

 Codfish  <0.10 kU/L    Class 0  1

 

 Shrimp  <0.10 kU/L    Class 0  1









 Liver Function Tests  2018  Total Protein  7.4 g/dL    6.4-8.2  1









 Albumin  3.6 g/dL    3.4-5.0  1

 

 Globulin  3.8 g/dL    1.9-4.3  1

 

 Alb/Glob  0.9 ratio      1

 

 Bilirubin,Total  0.6 mg/dL    0.2-1.0  1

 

 Bilirubin,Direct  0.1 mg/dL    0.0-0.2  1

 

 Bilirubin,Indirect  0.5 mg/dL    0.0-0.9  1

 

 Sgot/Ast  23 U/L    15-37  1

 

 SGPT/Alt  29 U/L    12-78  1

 

 Alkaline Phosphatase  109 U/L      1









 Laboratory test finding  2018  Thyroxine (T4)  8.4 g/dL    4.7-13.3  1









 Thyroid Stim Hormone  0.86 uIU/mL    0.30-4.20  1









 Slide Review  2018  Slide Review  DIFF ORDERED      1, 8

 

 Path Review:  2018  Path Review:  .      1, 9

 

 Differential-WBC Confirm  2018  Total Cells Counted  100 #CELLS      1









 Band%  1 %    0-8  1

 

 Neutrophils%  79 %  High  33-73  1

 

 Lymph%  8 %  Low  20-42  1

 

 Atypical Lymph%  3 %    0-7  1

 

 Monocyte%  5 %    0-10  1

 

 Eosinophil%  3 %    0-5  1

 

 Basophil%  1 %    0-2  1

 

 Platelet Estimate  NORMAL      1, 10

 

 Polychromasia  0-1+      1

 

 Anisocytosis  1+      1

 

 Microcytosis  2+      1

 

 Basophilic Stippling  0-1+      1

 

 Elliptocytes  1+      1

 

 Differential Comment  LARGE PLATELETS  <SEE NOTE>      1, 11









 Laboratory test finding  2018  Immunoglobulin E,Total  118 IU/mL  High  0
-100  1









 1  Z91.018 L50.1 E55.9 E07.89

 

 2  Levels of Specific IgE       Class  Description of Class



   ---------------------------  -----  --------------------



   < 0.10         0         Negative



   0.10 -    0.31         0/I       Equivocal/Low



   0.32 -    0.55         I         Low



   0.56 -    1.40         II        Moderate



   1.41 -    3.90         III       High



   3.91 -   19.00         IV        Very High



   19.01 -  100.00         V         Very High



   >100.00         VI        Very High

 

 3  Performed at:  [facility]  [id number]



   : Lopez Rios MD, Phone:  [phone]



   Performed at:  [facility]  [id number]



   : Araceli B Reyes MD, Phone:  [phone]

 

 4  Result confirmed by repeat analysis.

 

 5  Vitamin D deficiency has been defined by the [place] of



   Medicine and an Endocrine Society practice guideline as a



   level of serum 25-OH vitamin D less than 20 ng/mL (1,2).



   The Endocrine Society went on to further define vitamin D



   insufficiency as a level between 21 and 29 ng/mL (2).



   1. IOM ([place] of Medicine). 2010. Dietary reference



   intakes for calcium and D. [place]: The



   National Academies Press.



   2. Jordan MF, Emily BIRCH, Elias ESCAMILLA, et al.



   Evaluation, treatment, and prevention of vitamin D



   deficiency: an Endocrine Society clinical practice



   guideline. JCEM. 2011; 96(5):1911-30.



   Performed at:  [facility] [address]  [id number]



   : Araceli B Reyes MD, Phone:  [phone]

 

 6  Thyroglobulin Antibody measured by Vinay Joanne



   Methodology

 

 7  According to the National Academy of Clinical Biochemistry,



   the reference interval for Thyroglobulin (TG) should be



   related to euthyroid patients and not for patients who



   underwent thyroidectomy. TG reference intervals for these



   patients depend on the residual mass of the thyroid tissue



   left after surgery. Establishing a post-operative baseline



   is recommended. The assay limit of quantitation is 0.1



   ng/mL



   Thyroglobulin measured by Vinay Joanne Immunometric



   Assay

 

 8  Instrument flagged sample for slide review.



   Less than 10% Bands seen, no other immature WBC's seen.



   RBC morphology essentially normal.



   Platelet estimate=

 

 9  Reviewed previous history, path review not indicated

 

 10  CHECKED,PLT EST. AGREES WITH INSTRUMENT VALUE.

 

 11  LARGE PLATELETS PRESENT.







Procedures







 Description

 

 No Information







Encounters







 Type  Date  Location  Provider  [facility] E/M  Dx

 

 Office Visit  2018  1:40p  [place] [facility]  YULIA Norris-C  [id number]  
J30.81









 J30.89

 

 J30.1







Plan of Care

2018 - YULIA Norris-CJ30.81 Allergic rhinitis due to animal (cat) (
dog) hair and xcvinoI16.89 Other allergic igqulufcX66.1 Allergic rhinitis due 
to pollenNew Labs:Rast Cat Dander E1Rast AAA S&amp;E 30 PanelNew Medication:
Permethrin 5 %Hydroxyzine HCL 25 mgPermethrinPermethrin 5 %Permethrin 1 %
Permethrin 1 %Cosmocil CQComments:Examined rash with Dr. Anderson and are 
concerning for scabies.Will treat with permethium cream and lotion for hair.She 
is scheduled for sx procedure on her foot at the end of the month.Follow up:f/u 
2-6 wksRecommendations:Continue all medications as prescribed. Start Permethrin 
Cream 5% to body neck to soles of feet, arms and hands - leave on 8-14 hrs and 
shower off, may repeat in 1 week if necessary permethrin 1% lotion to hair per 
package instruction. hydroxyzine 25 mg at bedtime for itching if needed 
Ventolin HFA q 4-6 hrs as needed  Callif condition worsens Will f/u with [facility] 
results when they are in .Please call if consistently using rescue inhaler &gt; 
2 times per week.  If rash improves with treatment will follow up after sx. 
Continue environmental control measures you have in place Wash sheets and 
pillow cases, blankets before treating with permethrin. Wear freshly laundered 
clothing.

## 2018-10-20 ENCOUNTER — HOSPITAL ENCOUNTER (EMERGENCY)
Dept: HOSPITAL 25 - UCCORT | Age: 68
Discharge: HOME | End: 2018-10-20
Payer: MEDICARE

## 2018-10-20 VITALS — DIASTOLIC BLOOD PRESSURE: 69 MMHG | SYSTOLIC BLOOD PRESSURE: 136 MMHG

## 2018-10-20 DIAGNOSIS — Z88.8: ICD-10-CM

## 2018-10-20 DIAGNOSIS — R19.7: Primary | ICD-10-CM

## 2018-10-20 DIAGNOSIS — F41.9: ICD-10-CM

## 2018-10-20 PROCEDURE — 99211 OFF/OP EST MAY X REQ PHY/QHP: CPT

## 2018-10-20 PROCEDURE — G0463 HOSPITAL OUTPT CLINIC VISIT: HCPCS

## 2018-10-20 NOTE — UC
UC General HPI





- HPI Summary


HPI Summary: 





Patient has been on multiple coarses of antibioitcs for s skin issue. she has 

developed pasty stool with a strong odor, concerned over  C diff.





- History of Current Complaint


Chief Complaint: UCGI


Stated Complaint: DIARRHEA, SKIN COMPLAINT


Time Seen by Provider: 10/20/18 19:04


Hx Obtained From: Patient


Hx Last Menstrual Period: n/a


Onset/Duration: Sudden Onset, Lasting Days


Timing: Constant


Onset Severity: Moderate


Current Severity: Moderate


Pain Intensity: 0


Associated Signs & Symptoms: Positive: Diarrhea


Related Hx: Recent Illness





- Allergy/Home Medications


Allergies/Adverse Reactions: 


 Allergies











Allergy/AdvReac Type Severity Reaction Status Date / Time


 


Adhesive Tape Allergy  ITCHINESS Verified 10/20/18 19:30


 


meloxicam Allergy  Nausea And Verified 10/20/18 19:30





   Vomiting  


 


pregabalin [From Lyrica] Allergy  depression Verified 10/20/18 19:30


 


ENVIRONMENTAL Allergy  Unknown Uncoded 10/20/18 19:30





   Reaction  





   Details  











Home Medications: 


 Home Medications





Cephalexin CAP* [Keflex CAP*] 500 mg PO TID 10/20/18 [History Confirmed 10/20/18

]


Cetirizine* [ZyrTEC 10 MG TAB*] 10 mg PO DAILY 10/20/18 [History Confirmed 10/20

/18]


Loratadine [Claritin] 10 mg PO DAILY 10/20/18 [History Confirmed 10/20/18]


Mirtazapine TAB* [Remeron TAB*] 15 mg PO BEDTIME 10/20/18 [History Confirmed 10/

20/18]











PMH/Surg Hx/FS Hx/Imm Hx


Previously Healthy: No - anxiety, skin rash





- Surgical History


Surgical History: Yes


Surgery Procedure, Year, and Place: C-SECTIONS2-RIGHT HAND CARPAL TUNNEL. 

BILATERAL OVARIES REMOVED.  T&A.  D&Cs.  BILATERAL BREAST LUMPECTOMIES.  LASER 

SX L EYE-FLOATERS.  SURGERIES  IN [place], [place] AND [place]





- Family History


Known Family History: Positive: Cardiac Disease, Hypertension, Respiratory 

Disease


   Negative: Diabetes, Seizure Disorder





- Social History


Alcohol Use: None


Substance Use Type: None


Smoking Status (MU): Never Smoked Tobacco


Have You Smoked in the Last Year: No





- Immunization History


Most Recent Influenza Vaccination: NONE 2017


Most Recent Tetanus Shot: 2010


Most Recent Pneumonia Vaccination: Sep 2017


Vaccination Up to Date: Yes





Review of Systems


Constitutional: Negative


Skin: Rash


Eyes: Negative


ENT: Negative


Respiratory: Negative


Cardiovascular: Negative


Gastrointestinal: Diarrhea


Genitourinary: Negative


Motor: Negative


Neurovascular: Negative


Musculoskeletal: Negative


Neurological: Negative


Psychological: Anxious


Is Patient Immunocompromised?: No


All Other Systems Reviewed And Are Negative: Yes





Physical Exam


Triage Information Reviewed: Yes


Appearance: Well-Appearing, Well-Nourished, Pain Distress


Vital Signs: 


 Initial Vital Signs











Temp  98.0 F   10/20/18 18:54


 


Pulse  71   10/20/18 18:54


 


Resp  17   10/20/18 18:54


 


BP  136/69   10/20/18 18:54


 


Pulse Ox  99   10/20/18 18:54











Vital Signs Reviewed: Yes


Eye Exam: Normal


ENT Exam: Normal


Dental Exam: Normal


Neck exam: Normal


Respiratory Exam: Normal


Cardiovascular Exam: Normal


Abdomen Description: Positive: Nontender, No Organomegaly, CVA Tenderness (R) - 

neg, CVA Tenderness (L) - neg, Distended - slightly


Bowel Sounds: Positive: Hyperactive


Musculoskeletal Exam: Normal


Neurological Exam: Normal


Psychological Exam: Normal


Skin: Positive: rashes - dermatitis all over body





Course/Dx





- Course


Course Of Treatment: hx obtained, exam performed ,meds reviewed, patient is 

very anixous, did talk with her for a while, reviewed her medication list and 

went over their uses. stool sample was formed, d/cd order for c diff,





- Differential Dx - Multi-Symptom


Provider Diagnoses: diarrhea





Discharge





- Sign-Out/Discharge


Documenting (check all that apply): Patient Departure


All imaging exams completed and their final reports reviewed: No Studies





- Discharge Plan


Condition: Stable


Disposition: HOME


Patient Education Materials:  Acute Diarrhea (ED)


Referrals: 


Trixie Albert MD [Primary Care Provider] - 


Additional Instructions: 


1. stop the keflex


2. increase fluid intake


3. If stool becomes watery then follow up





- Billing Disposition and Condition


Condition: STABLE


Disposition: Home

## 2019-03-26 ENCOUNTER — HOSPITAL ENCOUNTER (EMERGENCY)
Dept: HOSPITAL 25 - UCCORT | Age: 69
Discharge: HOME | End: 2019-03-26
Payer: MEDICARE

## 2019-03-26 VITALS — SYSTOLIC BLOOD PRESSURE: 143 MMHG | DIASTOLIC BLOOD PRESSURE: 81 MMHG

## 2019-03-26 DIAGNOSIS — Z88.1: ICD-10-CM
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
## 2019-03-26 DIAGNOSIS — I10: ICD-10-CM

## 2019-03-26 DIAGNOSIS — E11.9: ICD-10-CM

## 2019-03-26 DIAGNOSIS — Z91.09: ICD-10-CM

## 2019-03-26 DIAGNOSIS — S31.113A: Primary | ICD-10-CM

## 2019-03-26 PROCEDURE — 87070 CULTURE OTHR SPECIMN AEROBIC: CPT

## 2019-03-26 PROCEDURE — 87205 SMEAR GRAM STAIN: CPT

## 2019-03-26 PROCEDURE — G0463 HOSPITAL OUTPT CLINIC VISIT: HCPCS

## 2019-03-26 PROCEDURE — 99212 OFFICE O/P EST SF 10 MIN: CPT

## 2019-03-26 NOTE — UC
Skin Complaint HPI





- HPI Summary


HPI Summary: 





69 yo female states that last NOV she saw a dermatologist and had a RLQ abd 

abscess which spontaneously drained


had scant bloody discharge for a month


last week area itched and she vigorously scrubbed it


now "looks like someone stabbed me





no fever


no purulent d/d





- History of Current Complaint


Chief Complaint: UCSkin


Time Seen by Provider: 03/26/19 20:10


Stated Complaint: SKIN CONCERN


Hx Obtained From: Patient


Hx Last Menstrual Period: N/A


Onset/Duration: Sudden Onset, Lasting Days


Timing: Constant


Onset Severity: Mild


Current Severity: None


Pain Intensity: 0


Pain Scale Used: 0-10 Numeric


Location: Discrete


Aggravating Factor(s): Nothing


Alleviating Factor(s): Nothing


Associated Signs & Symptoms: Positive: Negative


Related History: Trauma





- Allergy/Home Medications


Allergies/Adverse Reactions: 


 Allergies











Allergy/AdvReac Type Severity Reaction Status Date / Time


 


Adhesive Tape Allergy  ITCHINESS Verified 03/26/19 20:05


 


meloxicam Allergy  Nausea And Verified 03/26/19 20:05





   Vomiting  


 


pregabalin [From Lyrica] Allergy  depression Verified 03/26/19 20:05


 


ENVIRONMENTAL Allergy  Unknown Uncoded 03/26/19 20:05





   Reaction  





   Details  














PMH/Surg Hx/FS Hx/Imm Hx


Previously Healthy: Yes


Endocrine History: Diabetes


Cardiovascular History: Hypertension





- Surgical History


Surgical History: Yes


Surgery Procedure, Year, and Place: C-SECTIONS 2-RIGHT HAND CARPAL TUNNEL. 

BILATERAL OVARIES REMOVED.  T&A.  D&Cs.  BILATERAL BREAST LUMPECTOMIES.  LASER 

SX L EYE-FLOATERS.  SURGERIES  IN [facility]





- Family History


Known Family History: Positive: Cardiac Disease, Hypertension, Respiratory 

Disease


   Negative: Diabetes, Seizure Disorder





- Social History


Alcohol Use: None


Substance Use Type: None


Smoking Status (MU): Never Smoked Tobacco


Have You Smoked in the Last Year: No





- Immunization History


Most Recent Influenza Vaccination: NONE 2017


Most Recent Tetanus Shot: 2010


Most Recent Pneumonia Vaccination: Sep 2017


Vaccination Up to Date: Yes





Review of Systems


All Other Systems Reviewed And Are Negative: Yes


Constitutional: Positive: Negative


Skin: Positive: Other - see image


Eyes: Positive: Negative


ENT: Positive: Negative


Respiratory: Positive: Negative


Cardiovascular: Positive: Negative


Gastrointestinal: Positive: Negative


Genitourinary: Positive: Negative


Motor: Positive: Negative


Neurovascular: Positive: Negative


Musculoskeletal: Positive: Negative


Neurological: Positive: Negative


Psychological: Positive: Negative





Physical Exam


Vital Signs: 


 Initial Vital Signs











Temp  97.9 F   03/26/19 19:59


 


Pulse  71   03/26/19 19:59


 


Resp  17   03/26/19 19:59


 


BP  143/81   03/26/19 19:59


 


Pulse Ox  96   03/26/19 19:59














Images


Front/Back of Body, Lg (Mono): 


  __________________________














  __________________________





 1 - skin tear (2.8cm) does not appear infected








Course/Dx





- Course


Course Of Treatment: 





culture obtained











- Diagnoses


Provider Diagnosis: 


 Skin tear








Discharge





- Sign-Out/Discharge


Documenting (check all that apply): Patient Departure


All imaging exams completed and their final reports reviewed: No Studies





- Discharge Plan


Condition: Stable


Disposition: HOME


Patient Education Materials:  Acute Wounds (ED)


Referrals: 


Trixie Albert MD [Primary Care Provider] - 1 Day


Additional Instructions: 


GENTLY clean twice daily with soap and water





you may apply bactroban oint





recheck in one week





culture obtained





- Billing Disposition and Condition


Condition: STABLE


Disposition: Home

## 2019-04-22 ENCOUNTER — HOSPITAL ENCOUNTER (EMERGENCY)
Dept: HOSPITAL 25 - UCCORT | Age: 69
Discharge: HOME | End: 2019-04-22
Payer: MEDICARE

## 2019-04-22 VITALS — DIASTOLIC BLOOD PRESSURE: 63 MMHG | SYSTOLIC BLOOD PRESSURE: 151 MMHG

## 2019-04-22 DIAGNOSIS — Z91.09: ICD-10-CM

## 2019-04-22 DIAGNOSIS — H93.8X2: Primary | ICD-10-CM

## 2019-04-22 DIAGNOSIS — Z88.8: ICD-10-CM

## 2019-04-22 DIAGNOSIS — Z79.899: ICD-10-CM

## 2019-04-22 DIAGNOSIS — J45.909: ICD-10-CM

## 2019-04-22 PROCEDURE — 99212 OFFICE O/P EST SF 10 MIN: CPT

## 2019-04-22 PROCEDURE — G0463 HOSPITAL OUTPT CLINIC VISIT: HCPCS

## 2019-04-22 NOTE — UC
General HPI





- HPI Summary


HPI Summary: 





pt c/o her L ear being plugged and having a hissing noise since last pm.


no hx injury, fever or uri.


no hearing loss or dizziness.


it is somewhat better today.





- History of Current Complaint


Chief Complaint: UCEar


Stated Complaint: LEFT EAR PLUGGED


Time Seen by Provider: 04/22/19 17:24


Hx Obtained From: Patient


Hx Last Menstrual Period: N/A


Timing: Constant


Pain Intensity: 0


Associated Signs & Symptoms: Negative: Fever, Headache





- Allergy/Home Medications


Allergies/Adverse Reactions: 


 Allergies











Allergy/AdvReac Type Severity Reaction Status Date / Time


 


Adhesive Tape Allergy  ITCHINESS Verified 03/26/19 20:05


 


meloxicam Allergy  Nausea And Verified 03/26/19 20:05





   Vomiting  


 


pregabalin [From Lyrica] Allergy  depression Verified 03/26/19 20:05


 


ENVIRONMENTAL Allergy  Unknown Uncoded 03/26/19 20:05





   Reaction  





   Details  











Home Medications: 


 Home Medications





Albuterol HFA INHALER* [Ventolin HFA Inhaler*] 1 puff INH Q4H PRN 04/22/19 [

History Confirmed 04/22/19]


Trazodone HCl 100 mg PO DAILY 04/22/19 [History Confirmed 04/22/19]











PMH/Surg Hx/FS Hx/Imm Hx





- Additional Past Medical History


Additional PMH: 


allergies , sleep disturbance


Respiratory History: Asthma


GI/ History: Gastroesophageal Reflux


Psychological History: Anxiety, Depression





- Surgical History


Surgical History: Yes


Surgery Procedure, Year, and Place: C-SECTIONS 2-RIGHT HAND CARPAL TUNNEL. 

BILATERAL OVARIES REMOVED.  T&A.  D&Cs.  BILATERAL BREAST LUMPECTOMIES.  LASER 

SX L EYE-FLOATERS.  SURGERIES  IN [facility]





- Family History


Known Family History: Positive: Cardiac Disease, Hypertension, Respiratory 

Disease


   Negative: Diabetes, Seizure Disorder





- Social History


Alcohol Use: None


Substance Use Type: None


Smoking Status (MU): Never Smoked Tobacco


Have You Smoked in the Last Year: No





- Immunization History


Most Recent Influenza Vaccination: NONE 2017


Most Recent Tetanus Shot: 2010


Most Recent Pneumonia Vaccination: Sep 2017


Vaccination Up to Date: Yes





Review of Systems


All Other Systems Reviewed And Are Negative: Yes


Constitutional: Negative: Fever


ENT: Positive: Other - no hearing loss.  Negative: Sore Throat, Nasal Discharge

, Sinus Congestion, Sinus Pain/Tenderness


Neurological: Positive: Other - no vertigo.  Negative: Headache





Physical Exam


Triage Information Reviewed: Yes


Appearance: Well-Appearing


Vital Signs: 


 Initial Vital Signs











Temp  97.6 F   04/22/19 17:14


 


Pulse  72   04/22/19 17:14


 


Resp  16   04/22/19 17:14


 


BP  151/63   04/22/19 17:14


 


Pulse Ox  97   04/22/19 17:14











Vital Signs Reviewed: Yes


Eyes: Positive: Conjunctiva Clear


ENT: Positive: Pharynx normal, TMs normal, Other - Canals are clear. no 

auricular adenopathy. gross hearing is intact..  Negative: Nasal congestion, 

Nasal drainage


Neck: Positive: Supple, Nontender, No Lymphadenopathy


Respiratory: Positive: Lungs clear, Normal breath sounds


Cardiovascular: Positive: RRR, No Murmur


Musculoskeletal: Positive: ROM Intact


Neurological: Positive: Alert


Psychological: Positive: Age Appropriate Behavior


Skin Exam: Normal


Skin: Negative: Rashes





Course/Dx





- Differential Dx - Multi-Symptom


Differential Diagnoses: Other - no concern for om, oe, mastoiditis. no cerumen 

in the canal. doubt menieres. will start a nasal steroid for eustachian tube 

dysfunction. decongestant declined due to hx of sleep disturbance. will refer 

to Dr Mariee, pt's ent. no hx HTN, pt notes very stressed. BP visit related.





- Diagnoses


Provider Diagnosis: 


 Sensation of plugged ear on left side








Discharge





- Sign-Out/Discharge


Documenting (check all that apply): Patient Departure


All imaging exams completed and their final reports reviewed: No Studies





- Discharge Plan


Condition: Stable


Disposition: HOME


Prescriptions: 


Fluticasone NASAL SPRAY 50MCG* [Flonase NASAL SPRAY 50MCG*] 2 spray BOTH NARES 

DAILY 14 Days #1 btl


Patient Education Materials:  Earache (ED)


Referrals: 


Freddy Mariee MD [Medical Doctor] - 


Additional Instructions: 


follow up with Dr Mariee if not better in 1 week or sooner if worse.





- Billing Disposition and Condition


Condition: STABLE


Disposition: Home





- Attestation Statements


Provider Attestation: 





Per institutional requirements, I have reviewed the chart, however, I was not 

consulted specifically or made aware of this patient by the  midlevel provider.

  I did not personally evaluate, interact with , or disposition  this patient.

## 2019-04-22 NOTE — XMS REPORT
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          



     [place] [address] [phone]          









 Antibody Screen  NEGATIVE      









 Laboratory test  2011  [facility]  Cancer Antigen  8.9 U/mL    0.0-34.0  40



 finding    [address]  (CA) [address]        



     [place] [address] [phone]          

 

 LDL Cholesterol  2010  [facility]  Cholesterol  201  High  120-200  



 Profile    [address]    mg/dL      



     [place], [place] [id number] [phone]          









 Triglycerides  250 mg/dL  High  0-210  

 

 HDL Cholesterol  45 mg/dL    32-96  

 

 LDL-Cholesterol  106 mg/dL      









 Laboratory test  2010  [facility]  Thyroid Stim  1.20 uIU/mL    0.49-4.67  41



 finding    [address]  Horm[place]        



     [place] [address] [phone]          









 Cancer Antigen (CA) 125  10.0 U/mL    0.0-34.0  42









 CBC/Manual  2010  [facility]  White Blood  8.5 K/uL    3.1-10.7  



 Differential    [address]  Count        



     [place] [address] [phone]          









 Red Blood Count  5.20 M/uL    3.90-5.40  

 

 Hemoglobin  11.0 gm/dL  Low  11.6-15.8  

 

 Hematocrit  33.7 %  Low  36.0-46.1  

 

 Mean Cell Volume  64.8 fl  Low  80.9-99.0  43

 

 Mean Corpuscular HGB  21.2 pg  Low  25.9-32.7  

 

 Mean Corpuscular HGB Conc  32.6 g/dL    30.8-34.3  

 

 Platelet Count  269 K/uL    155-360  

 

 Red Cell Distri Width %CV  16.8 %  High  11.7-14.4  

 

 Mean Platelet Volume  10.2 fL    8.9-12.4  

 

 Total Cells Counted  100 #CELLS      

 

 Neutrophils%  77 %  High  33-73  

 

 Lymph%  15 %  Low  17-56  

 

 Platelet Estimate  NORMAL      

 

 Atypical Lymph%  1 %    0-7  

 

 Monocyte%  2 %    0-10  

 

 Eosinophil%  3 %    0-5  

 

 Basophil%  2 %    0-2  

 

 Polychromasia  0-1+      

 

 Microcytosis  2+      

 

 Basophilic Stippling  0-1+      

 

 Ovalocytes  0-1+      









 Laboratory test  2010  [facility]  ThinPrep Pap:  See Note      44



 finding    [address]  Endocervix Smear        



     [place] [address] [phone]          









 1  Z86.39

 

 2  ACTH reference interval for samples collected between 7 and



   10 AM.



   Performed at:   - [facility] [address]  [id number]



   : Araceli B Reyes MD, Phone:  [phone]

 

 3  R93.3, P74.4, G86.9

 

 4  The atypical pANCA pattern has been observed in a



   significant percentage of patients with ulcerative colitis,



   primary sclerosing cholangitis and autoimmune hepatitis.



   ASCA+/PANCA- Suggestive of Crohn's disease



   ASCA-/PANCA+ Suggestive of Ulcerative colitis



   Performed at:   - [facility] [address]  [id number]



   : Carlos Rios MD, Phone:  [phone]

 

 5  Negative         <20.0



   Equivocal  20.1 - 24.9



   Positive     >or=25.0

 

 6  Negative        <20.0



   Equivocal 20.1 - 24.9



   Positive    >or=25.0



   IgA and IgG antibody testing for S. cerevisiae is



   useful adjunct testing for differentiating Crohn's



   disease and ulcerative colitis. Close to 80% of



   Crohn's disease patients are positive for either



   IgA or IgG. In ulcerative colitis, less than 15% are



   positive for IgG and less than 2% are positive for



   IgA. Fewer than 5% are positive for either IgG or



   IgA antibody, and no healthy controls had antibody



   for both.

 

 7  Method: Sediplast Modified Westergwaldo

 

 8  Reference Guidelines*:



   Desirable: ........... < 200 mg/dL



   Borderline High: ..... 200-239 mg/dL



   High: ................ >=240 mg/dL



   



   * The National Cholesterol Education Program (NCEP)

 

 9  Reference Guidelines*:



   Normal: ............. < 150 mg/dL



   Borderline High: .... 150-199 mg/dL



   High: ............... 200-499 mg/dL



   Very High: .......... > 500 mg/dL



   * Source: National Cholesterol Education Program (NCEP)

 

 10  Reference Guidelines*:



   Low HDL: ..... < 40 mg/dL



   Normal:  ..... 40-60 mg/dL



   Desirable: ... > 60 mg/dL



   



   *The National Cholesterol Education Program(NCEP)

 

 11  Reference Guidelines*:



   Optimal:........... <100 mg/dL



   Near Optimal....... 100-129 mg/dL



   Borderline High.... 130-159 mg/dL



   High............... 160-189 mg/dL



   Very High.......... >=190 mg/dL



   * Source: National Cholesterol Education Program (NCEP)

 

 12  R10.9

 

 13  NEGATIVE FOR C. DIFFICILE TOXIN A/B.



   



   Method:  Alere Tox A/B Quik Chek Rapid Immunoassay



   CORRELATE RESULTS WITH CLINICAL CONDITION.

 

 14  Normal (<60 Droplets/HPF)

 

 15  Normal (<100 Droplets/HPF)



   



   Performed at:  [facility]  [id number]



   : Araceli B Reyes MD, Phone:  [phone]

 

 16  NO ENTERIC PATHOGENS ISOLATED

 

 17  ...................................................

 

 18  INCLUDES TESTING FOR SALMONELLA, SHIGELLA, AEROMONAS,

 

 19  PLESIOMONAS, CAMPYLOBACTER, AND E. COLI 0157:H7

 

 20  ...................................................

 

 21  YERSINIA AND VIBRIO ARE NOT ROUTINELY SCREENED FOR AND

 

 22  SHOULD BE REQUESTED SEPARATELY

 

 23  SHIGA TOXIN 1 NOT DETECTED

 

 24  SHIGA TOXIN 2 NOT DETECTED



   



   Method: ImmunoCard STAT/EHEC



           Rapid Immunochromatographic Assay

 

 25  NEGATIVE FOR CRYPTOSPORIDIUM SPECIFIC ANTIGEN

 

 26  NEGATIVE FOR GIARDIA SPECIFIC ANTIGEN.



   The specimen will be held for 5 days. Additional testing may



   be performed upon request if the antigen tests are negative,



   and the patient is still symptomatic or has traveled to an



   endemic region.



   



   Method: Alere Quik Chek Rapid Membrane Enzyme Immunoassay

 

 27  Elevated levels of HbA1c suggest the need for more



   aggressive treatment of glycemia.



   



   The American Diabetes Association recommends that a primary



   goal of therapy should be a HbA1c of <7% and that physicians



   should re-evaluate the treatment regimen in patients with



   HbA1c values consistently >8%.

 

 28  Method: Sediplast Modified Westergren

 

 29  ACUTE COLITIS, DIARRHEA, ABD PAIN, UTI

 

 30  Note:



   Persistent reduction for 3 months or more in an eGFR <60



   mL/min/1.73 m2 defines CKD.  Patients with eGFR values >/=60



   mL/min/1.73 m2 may also have CKD if evidence of persistent



   proteinuria is present.



   



   The original MDRD equation for estimated GFR is not valid



   for patients less than 18 years of age.



   



   Additional information may be found at www.kdoqi.org.

 

 31  > 100,000 CFU/mL



   SPECIMEN IS A MIX OF GRAM POSITIVE ORGANISMS CONSISTENT WITH



   SKIN VAGINAL CONTAMINATION.



   SUGGEST REPEAT SPECIMEN IF CLINICALLY INDICATED.

 

 32  PSYCH SERVICES

 

 33  OPERATION/PROCEDURE



   US right breast biopsy 9:00, 10 cm.



   DIAGNOSIS:



   "RIGHT BREAST BIOPSY, 9:00":



   



   FIBROCYSTIC CHANGE CONSISTING OF STROMAL FIBROSIS, DUCTAL DILATION, APOCRINE



   METAPLASIA, MILD DUCTAL HYPERPLASIA WITHOUT ATYPIA, AND MICROCALCIFICATIONS



   ASSOCIATED WITH EPITHELIAL STRUCTURES.



   



   NO EVIDENCE OF MALIGNANCY.



   



   WYS/farhadf



   D: 2013 1:26:54 PM



   T: 2013 1:39



   GROSS



   Received in formalin labeled, "RIGHT BREAST BIOPSY AT 9:00, 10 CM." are four 
pieces of



   tan, soft tissue measuring up to 1.7 cm. in length and 0.3 cm. diameter.  
Submitted in



   toto in one block.  JW/clf



   MICROSCOPIC



   Sections show several changes characterized by:  an increase in stromal 
fibrosis, ductal



   dilation, cyst formation lined by apocrine cells with hobnail shape and deep



   eosinophilic cytoplasm. There are microcalcifications associated with 
epithelial



   structures.



   PRE OPERATIVE DIAGNOSIS



   Mass and microcalcs



   REVIEW CODE



   CODE: I



   -----------------------------------------------------------------------------
---------------



   Signed                                          CARLOS JACOB MD 13 
1406



   -----------------------------------------------------------------------------
---------------

 

 34  CYTOLOGY SCREENER - GYN @ 



   Screened by: MARQUES Christian(ASCP)



   PAP: FINAL REPORT



   SPECIMEN ADEQUACY:



   SPECIMEN SATISFACTORY FOR INTERPRETATION



   INTERPRETATION:



   ENDOMETRIAL CELLS IN A WOMAN=>40 YEARS OF AGE



   ESPECIALLY AFTER MENOPAUSE MAY BE ASSOCIATED WITH BENIGN



   ENDOMETRIUM, HORMONAL ALTERATIONS AND, LESS COMMONLY, ENDOMETRIAL/



   UTERINE ABNORMALITIES



   NEGATIVE FOR SQUAMOUS INTRAEPITHELIAL LESION OR MALIGNANCY



   COMMENT:



   SHIFT IN CADY SUGGESTIVE OF BACTERIAL VAGINOSIS



   BENIGN CELLULAR CHANGES ASSOCIATED WITH INFLAMMATION



   ENDOMETRIAL SAMPLING SUGGESTED IF CLINICALLY INDICATED



   THINPREP PREPARED PAP SLIDE #



   Prepared in the Cytology laboratory from the ThinPrep sample is 1  ThinPrep 
smear.



   PAP ACCESSI QUESTIONNAIRE 1/10



   PERTINENT CLINICAL HISTORY FOR PAP (GYN) CYTOLOGY (Check all that apply):



   Pregnant? N   Post Partum? N   Menopause? Y   LMP date: 15 YRS AGO



   Last Pap: at [facility]? Y   Abnormal Pap?     If Yes, date:



   If patient had related surgical procedure:



   Related Therapy:



   Significant Clinical History:



   V72.31



   =============================================================================
===========



   DISCLAIMER:



   The Pap smear is a screening test and not a diagnostic procedure.  False 
negative and



   false positive results can and do occur for a number of reasons.  Regular 
screening



   provides an aid in detecting treatable cervical abnormalities, but should 
not be used



   as the only means for detecting cervical dysplasia and carcinoma.



   -----------------------------------------------------------------------------
---------------



   DHIRAJ Gaston MD 10/10/
11 1344



   -----------------------------------------------------------------------------
---------------

 

 35  COLONY COUNT              ! >100,000 CFU/ml



   Organism 1                   ! ESCHERICHIA COLI



   QUANTITY                  ! MANY



   Organism 2                   ! ENTEROCOCCUS FAECALIS



   QUANTITY                  ! MODERATE



   ESCHERICHIA COLI



   Target Route Dose       M.I.C.   RX     AB COST



   ------ ----- ---------- -------- ------ -- ------



   NITROFURANTOIN                                         <=16     S



   TRIMETHOPRIM/SULFAMETHOXAZOLE                          <=20     S



   AMPICILLIN                                             <=2      S



   CEFAZOLIN                                              <=4      S



   AMPICILLIN/SULBACTAM                                   <=2      S



   CIPROFLOXACIN                                          <=0.25   S



   CEFTAZIDIME                                            <=1      S



   CEFTRIAXONE                                            <=1      S



   CEFEPIME                                               <=1      S



   LEVOFLOXACIN                                           <=0.12   S



   IMIPENEM                                               <=1      S



   GENTAMICIN                                             <=1      S



   TOBRAMYCIN                                             <=1      S



   CEFOXITIN                                              <=4      S



   ENTEROCOCCUS FAECALIS



   Target Route Dose       M.I.C.   RX     AB COST



   ------ ----- ---------- -------- ------ -- ------



   PENICILLIN G                   BLOOD  PO    500 mg     8        S         
0.03



   IV    1 mu                S         0.01



   IV    2 mu                S         1.22



   TETRACYCLINE                   BLOOD  PO    250 mg     >=16     R         
0.03



   NITROFURANTOIN                                         <=16     S



   AMPICILLIN                                             <=2      S



   AMOXICILLIN                                                     S



   AMOXICILLIN/CLAVULANATE                                         S



   AMPICILLIN/SULBACTAM                                            S



   CIPROFLOXACIN                                          <=0.5    S



   LEVOFLOXACIN                                           1        S



   PIPERACILLIN                                                    S



   VANCOMYCIN                     BLOOD  IV    500 mg     1        S         
2.62

 

 36  OPERATION/PROCEDURE



   D&C



   DIAGNOSIS:



   "ENDOMETRIUM, D&C":



   



   MIXED FRAGMENTS OF WEAKLY PROLIFERATIVE AND INACTIVE ENDOMETRIUM, CONSISTENT 
WITH



   POSTMENOPAUSAL STATE.



   



   BENIGN ENDOCERVICAL GLANDS, AND SQUAMOUS TISSUE FRAGMENTS.



   



   NO EVIDENCE OF HYPERPLASIA NOR NEOPLASIA.



   



   



   WYS/jagdeep



   D: 2011 12:39:05 PM



   T: 11 1345



   GROSS



   "ENDOMETRIAL CURETTINGS".  The specimen is received in an appropriately 
labeled



   container.  This contains 0.5 mL of pink tissue admixed with mucus.  
Filtered and



   submitted in toto within a single cassette.  WS/jagdeep



   MICROSCOPIC



   Sections reveal a mixture of weakly proliferative and inactively 
proliferative type



   glands in a edematous stroma.  The glands are tubular, largely nonstratified
, and



   mitotically inactive.  Scattered in the slide are strip-like fragments lined 
by a single



   layer of columnar cells and benign squamous fragments.



   PRE OPERATIVE DIAGNOSIS



   Endometrial hyperplasia



   REVIEW CODE



   CODE: I



   -----------------------------------------------------------------------------
---------------



   CARLOS Carrillo MD 11 
1454



   -----------------------------------------------------------------------------
---------------

 

 37  POSSIBLE  UROGENITAL CONTAMINATION.

 

 38  11 LAB.GEMA    Deleted by [employer]lex Group [employer]

 

 39  Note:



   Persistent reduction for 3 months or more in an eGFR <60



   mL/min/1.73 m2 defines CKD.  Patients with eGFR values >/=60



   mL/min/1.73 m2 may also have CKD if evidence of persistent



   proteinuria is present.



   The original MDRD equation for estimated GFR is not valid



   for patients less than 18 years of age.



   Additional information may be found at www.kdoqi.org.

 

 40  Roche ECLIA methodology



   Values obtained with different assay methods or kits cannot



   be used interchangeably.  Results cannot be interpreted as



   absolute evidence of the presence or absence of malignant



   disease.



   Performed at:  [facility]  [id number]



   : Maxi Chandra MD, Phone:  [phone]

 

 41  QUERY: @[facility] Pat ID:



   QUERY: @[facility] Req #:

 

 42  Roche ECLIA methodology



   Values obtained with different assay methods or kits cannot



   be used interchangeably.  Results cannot be interpreted as



   absolute evidence of the presence or absence of malignant



   disease.



   Performed at:  [facility]  [id number]



   : Maxi Chandra MD, Phone:  [phone]

 

 43  Result confirmed by repeat analysis.

 

 44  PAP: FINAL REPORT



   SPECIMEN ADEQUACY:



   SPECIMEN SATISFACTORY FOR INTERPRETATION



   INTERPRETATION:



   NEGATIVE FOR INTRAEPITHELIAL LESION OR MALIGNANCY



   COMMENT:



   ATROPHIC PATTERN



   THINPREP PREPARED PAP SLIDE #



   Prepared in the Cytology laboratory from the ThinPrep sample is 1  ThinPrep 
smear.



   PAP ACCESSI QUESTIONNAIRE 1/10



   PERTINENT CLINICAL HISTORY FOR PAP (GYN) CYTOLOGY (Check all that apply):



   Pregnant?     Post Partum?     Menopause? Y   LMP date: 



   Last Pap: at [facility]?     Abnormal Pap? N   If Yes, date: 



   If patient had related surgical procedure:



   Related Therapy:



   Significant Clinical History:



   60 YEARS OLD; V72.31



   =============================================================================
===========



   DISCLAIMER:



   The Pap smear is a screening test and not a diagnostic procedure.  False 
negative and



   false positive results can and do occur for a number of reasons.  Regular 
screening



   provides an aid in detecting treatable cervical abnormalities, but should 
not be used



   as the only means for detecting cervical dysplasia and carcinoma.



   -----------------------------------------------------------------------------
---------------



   PANDA Lovelace 09/27/10



   -----------------------------------------------------------------------------
---------------







Procedures







 Date  Code  Description  Status

 

 2018  36912221  Colonoscopy  Completed

 

 2018  74103  Colonoscopy With Biopsy  Completed

 

 2017  [id number]  Colonoscopy  Completed

 

 2017  15819  Colonoscopy With Biopsy  Completed

 

 2017  25012  EGD With Biopsy  Completed

 

 2015  [id number]  Mammogram  Completed

 

 2015  96977  Stress Test Interpre And Report Only  Completed

 

 2015  81143  Stress Test Physician Super Only  Completed

 

 2015  30097  Stress Test Physician Super Only  Completed

 

 2015  78323  Myocardial Imaging Tomographic Multiple Study AT Rest  
Completed



     Or Stress  

 

 2015  19278  Echocardiogram Complete  Completed

 

 12/10/2014  [id number]  Mammogram  Completed

 

 2014  [id number]  Mammogram  Completed

 

 2013  57887  Anesthesia, Lower Abdomen Surgery Not Otherwise Spec  
Completed

 

 2013  03225  EKG Interpretation And Report Only  Completed

 

 2013  [id number]  Mammogram  Completed

 

 10/31/2013  [id number]  Mammogram  Completed

 

 2013  02022025  Colonoscopy  Completed

 

 10/30/2012  [id number]  Mammogram  Completed

 

 2012  78872  Stress Test Physician Super Only  Completed

 

 2012  72924  Stress Test Physician Super Only  Completed

 

 2012  69292  Stress Test Interpre And Report Only  Completed

 

 10/19/2011  [id number]  Mammogram  Completed

 

 2011  67891  Dilation And Currettage Diagnostic Nonobstetrical  Completed

 

 2011  88064  Anesthesia, Vaginal Procedures Not Otherwise Spec  Completed

 

 2011  96580  EKG Interpretation And Report Only  Completed

 

 2010  81661  EKG-Tracing And Report  Completed

 

 2010  74302  Stress Test Physician Super Only  Completed

 

 2010  89058  Stress Test Interpre And Report Only  Completed

 

 2010  12562  Echocardiogram Complete  Completed

 

 2010  [id number]  Mammogram  Completed







Encounters







 Type  Date  Location  Provider  Dx  Diagnosis

 

 Office Visit  2019  Ben Landeros MD  K56.609  Unsp intestnl obst,



   11:45a        unsp as to partial



           versus complete obst









 Z86.39  Personal history of endo, nutritional and metabolic disease









 Office Visit  2018  2:00p  Ben Landeros MD  K63.5  Polyp of colon









 R19.7  Diarrhea, unspecified

 

 R11.0  Nausea









 Office Visit  2018  1:30p  Ben Landeros MD  R19.7  Diarrhea, 
unspecified









 F33.2  Major depressv disorder, recurrent severe w/o psych features









 Office Visit  2017  2:30p  Ben Landeros MD  R93.3  Abnormal 
findings on dx



           imaging of prt digestive



           tract









 R63.4  Abnormal weight loss

 

 R19.7  Diarrhea, unspecified

 

 D50.9  Iron deficiency anemia, unspecified









 Office Visit  2017  1:00p  Ben Landeros MD  R10.9  Unspecified 
abdominal



           pain









 R93.3  Abnormal findings on dx imaging of prt digestive tract









 Office Visit  2017  2:00p  Ben Landeros MD  R10.9  Unspecified 
abdominal



           pain









 Z12.11  Encounter for screening for malignant neoplasm of colon









 Office Visit  2015  3:00p  Cardiology Office  Cristofer Pereyra  786.50  
Pain Chest



       [facility], [facility]    Unspec









 785.2  Murmur Cardiac Undiagnosed









 Office Visit  2015  Cardiology  Cristofer Pereyra  786.50  Pain Chest 
Unspec



   6:02p  Office  [facility], [facility]    

 

 Office Visit  2014  Surgical Office  Efren Denney,  793.89  Abnormal 
Findings



   3:00p    MD    On Radiological



           Exam Of Breast



           Other

 

 Office Visit  2013  Surgical Office  Efren Denney,  793.89  Abnormal 
Findings



   2:00p    MD    On Radiological



           Exam Of Breast



           Other

 

 Office Visit  10/06/2011  OB/Gyn Office  Davey Schuler  V72.31  Routine Gyn



   4:30p    MBarbaraDBarbara    Examination









 620.2  Ovarian Cyst Other & Unspec

 

 V76.41  Screening Malignant Neoplasm Rectum









 Office Visit  2011  8:30a  OB/Gyn Office  Davey Schuler M.D.  620.2  
Ovarian Cyst



           Other & Unspec









 599.0  UTI Urinary Tract Infection Site Not Spec









 Office Visit  2011  3:20p  OB/Gyn Office  Davey Schuler M.D.  620.2  
Ovarian Cyst



           Other & Unspec









 621.30  Endometrial Hyperplasia NOS









 Office Visit  2011  6:00p  OB/Gyn Office  Davey Schuler  620.2  Ovarian 
Cyst



       M.D.    Other & Unspec

 

 Office Visit  2010  3:00p  Cardiology Office  Jose Garibay  786.59  Pain 
Chest



       MD HECTOR, PhD    Other









 272.2  Hyperlipidemia Mixed

 

 785.2  Murmur Cardiac Undiagnosed









 Office Visit  2010  1:20p  Cardiology Office  Jose Garibay  272.2  
Hyperlipidemia Juan YOUNG MD, PhD    









 785.2  Murmur Cardiac Undiagnosed

 

 786.59  Pain Chest Other

 

 786.05  Shortness Of Breath









 Office Visit  2010  5:00p  OB/Gyn Office  Davey Schuler,  V72.31  Routine 
Gyn



       M.D.    Examination









 620.2  Ovarian Cyst Other & Unspec







Plan of Treatment

Future Appointment(s):2019  2:00 pm - Ben Benitez MD at [facility] 
- Ben Benitez MDK56.609 Unspecified intestinal obstruction, unspecified as 
to partiaComments:history of small bowel obstruction vs ileus complains of food 
sticking arrange for Ugi small bowel nlywexJ46.010 Personal history of colonic 
polypsNew Medication:Bisacodyl Ec 5 mg - take 4 tablets by mouth at 8pm day 
before procedureCitrate Of Magnesia 1.745 GM/30ML - 1 bottle at 1pm day before 
procedurePlenvu 140 gm - take half the bottle the day before the exam half the 
morningComments:arrange for colonoscopy

## 2020-01-07 ENCOUNTER — HOSPITAL ENCOUNTER (EMERGENCY)
Dept: HOSPITAL 25 - UCCORT | Age: 70
Discharge: HOME | End: 2020-01-07
Payer: MEDICARE

## 2020-01-07 VITALS — SYSTOLIC BLOOD PRESSURE: 142 MMHG | DIASTOLIC BLOOD PRESSURE: 74 MMHG

## 2020-01-07 DIAGNOSIS — Z91.09: ICD-10-CM

## 2020-01-07 DIAGNOSIS — N39.0: Primary | ICD-10-CM

## 2020-01-07 DIAGNOSIS — R15.9: ICD-10-CM

## 2020-01-07 DIAGNOSIS — Z88.6: ICD-10-CM

## 2020-01-07 DIAGNOSIS — Z88.8: ICD-10-CM

## 2020-01-07 DIAGNOSIS — R53.83: ICD-10-CM

## 2020-01-07 PROCEDURE — 99212 OFFICE O/P EST SF 10 MIN: CPT

## 2020-01-07 PROCEDURE — G0463 HOSPITAL OUTPT CLINIC VISIT: HCPCS

## 2020-01-07 PROCEDURE — 87046 STOOL CULTR AEROBIC BACT EA: CPT

## 2020-01-07 PROCEDURE — 83630 LACTOFERRIN FECAL (QUAL): CPT

## 2020-01-07 PROCEDURE — 81003 URINALYSIS AUTO W/O SCOPE: CPT

## 2020-01-07 PROCEDURE — 87086 URINE CULTURE/COLONY COUNT: CPT

## 2020-01-07 PROCEDURE — 87899 AGENT NOS ASSAY W/OPTIC: CPT

## 2020-01-07 PROCEDURE — 87045 FECES CULTURE AEROBIC BACT: CPT

## 2020-01-07 NOTE — XMS REPORT
Continuity of Care Document (CCD)

 Created on:2019



Patient:Ortolani, Grace

Sex:Female

:1950

External Reference #:[id number]





Demographics







 Address  [address] [place] [id number]

 

 Mobile Phone  [phone]

 

 Email Address  [email or web address]

 

 Preferred Language  en

 

 Marital Status  Not  or 

 

 Hinduism Affiliation  Unknown

 

 Race  White

 

 Ethnic Group  Not  or 









Author







 Name  Eliseo Cleary MD (transmitted by agent of provider Young Greene)

 

 Address  [address] [place] [id number]









Care Team Providers







 Name  Role  Phone

 

 Trixie Albert MD - Family Medicine  Care Team Information   [phone]
[phone]









Problems







 Active Problems  Provider  Date

 

 Migraine  Bandar Pérez M.D.  Onset: 2016

 

 Achilles bursitis  Kirsten Carver MD  Onset: 2016

 

 Derangement of medial meniscus  Kirsten Carver MD  Onset: 2016







Social History







 Type  Date  Description  Comments

 

 Birth Sex    Unknown  

 

 ETOH Use    Denies alcohol use  

 

 Tobacco Use  Start: Unknown  Patient has never smoked  

 

 Recreational Drug Use    Denies Drug Use  

 

 Smoking Status  Reviewed: 19  Patient has never smoked  

 

 Exercise Type/Frequency    Does not exercise  







Allergies, Adverse Reactions, Alerts







 Active Allergies  Reaction  Severity  Comments  Date

 

 Lyrica  Severe depression  Severe  Severe Depression  2016

 

 Mobic  Nausea and Vomiting  Moderate    2016

 

 Augmentin    Mild  nausea  2016

 

 Lurasidone      dizziness and paranoid  2018

 

 Venlafaxine      can take name brand  2019









 Inactive Allergies









 NKDA        2015







Medications







 Active Medications  SIG  Qnty  Indications  Ordering  Date



         Provider  

 

 Ventolin HFA  as needed      Unknown  



          108(90Base)          



 mcg/Act Aerosol          



           

 

 Vitamin D3/Calcium  1 po qd      Unknown  



                3000Unit          



 Tablets          



           

 

 Claritin  1 by mouth every      Unknown  



      10mg Tablets  day as needed        



           

 

 Zyrtec Allergy  1 by mouth every      Unknown  



            10mg Tablets  day as needed        



 Dispers          



           

 

 Tylenol 8 Hour  as needed      Unknown  



 Arthritis Pain          



            650mg          



 Tablets ER          



           

 

 Clonazepam  Take 1 Tablet      Unknown  



        1mg Tablets  Three Times A Day        



           

 

 Trazodone HCL  1 tab po daily at      Unknown  



           100mg Tablets  hs        



           

 

 Pantoprazole Sodium  take 1 tablet by      Unknown  



                 40mg  mouth once daily        



 Tablets DR  Before A Meal For        



   GERD        

 

 Betamethasone  Apply 1 Thin      Unknown  



 Dipropionate  Application To Arm        



          0.05% Cream  Twice Daily as        



   needed        

 

 Effexor XR  1 by mouth twice      Unknown  



        150mg Caps ER  daily        



 24HR          

 

 Mirtazapine  1 tab by mouth at  30tabs    Unknown  



         15mg Tablets  bedtime        



           

 

 Lactaid  take 1 tab by      Unknown  



     3000Unit Tablets  mouth after        



   consuming yogurt        



   or dairy        

 

 Clobetasol Propionate  apply sparingly to      Unknown  



                   0.05%  effected area of        



 Cream  vulva twice a day        



   as needed        

 

 Prochlorperazine  1 every 4-6 hours      Unknown  



 Maleate  as needed for        



     5mg Tablets  nausea        



           

 

 Fluticasone Propionate  2 sprays each      Unknown  



   nostril daily as        



 50mcg/Act Suspension  needed        



           







Medications Administered in Office







 Medication  SIG  Qnty  Indications  Ordering Provider  Date

 

 Celestone 3 mg and 3mg        Eliseo Cleary MD  2019



            Injection          



           

 

 Celestone 3 mg and 3mg        Eliseo Cleary MD  2019



            Injection          



           

 

 Celestone 3 mg and 3mg        Eliseo Cleary MD  2019



            Injection          



           

 

 Celestone 3 mg and 3mg        Eliseo Cleary MD  2019



            Injection          



           

 

 Celestone 3 mg and 3mg        Eliseo Cleary MD  2019



            Injection          



           

 

 Celestone 3 mg and 3mg        Eliseo Cleary MD  2017



            Injection          



           

 

 Celestone 3 mg and 3mg        Eliseo Cleary MD  2017



            Injection          



           

 

 Depomedrol 40MG        Maxi Valdivia M.D.  2017



     Injection          

 

 Depomedrol 40MG        Eliseo Cleary MD  2016



     Injection          







Immunizations







 Description

 

 No Information Available







Vital Signs







 Date  Vital  Result  Comment

 

 2019  1:05pm  Height  59 inches  4'11"









 Heart Rate  62 /min  

 

 BP Systolic Sitting  126 mmHg  

 

 BP Diastolic Sitting  82 mmHg  

 

 Respiratory Rate  16 /min  

 

 Pain Level  10  

 

 O2 % BldC Oximetry  96 %  









 2019 11:31am  Height  59 inches  4'11"









 Weight  188.00 lb  

 

 Heart Rate  69 /min  

 

 BP Systolic Sitting  126 mmHg  

 

 BP Diastolic Sitting  72 mmHg  

 

 Respiratory Rate  18 /min  

 

 Pain Level  8  

 

 O2 % BldC Oximetry  96 %  

 

 BMI (Body Mass Index)  38.0 kg/m2  







Results







 Description

 

 No Information Available







Procedures







 Date  Code  Description  Status

 

 201910  Inject/Drain Joint/Bursa Major W/O US  Completed

 

 2019  00873  Inject/Drain Joint/Bursa Major W/O US  Completed

 

 2019  59062  Control Nasal Hemorrhage Anterior  Completed







Medical Devices







 Description

 

 No Information Available







Encounters







 Type  Date  Location  Provider  Dx  Diagnosis

 

 Office Visit  2019  [facility]  H69.83  Other specified



   3:15p  [facility]    disorders of



     [facility]      Eustachian tube,



           bilateral









 R04.0  Epistaxis









 Office Visit  2019  [facility]  Bandar THOMPSON  F06.31  Mood disorder



   3:00p  [facility]    due to known



     C[facility]      physiol cond w



           depressv



           features









 G43.009  Migraine w/o aura, not intractable, w/o status migrainosus







Assessments







 Date  Code  Description  Provider

 

 2019  M17.0  Bilateral primary osteoarthritis of knee  Eliseo Cleary MD

 

 2019  M17.12  Unilateral primary osteoarthritis, left  Eliseo Cleary MD



     knee  

 

 2019  M17.11  Unilateral primary osteoarthritis, right  Eliseo Cleary MD



     knee  

 

 2019  H69.83  Other specified disorders of Eustachian  [facility] M.D.



     tube, bilateral  

 

 2019  R04.0  Epistaxis  [facility] M.D.

 

 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



     condition with depr  

 

 2019  G43.009  Migraine without aura, not intractable,  Bandar Pérez M.D.



     without status migra  







Plan of Treatment

2019 - Eliseo Cleary, MDM17.0 Bilateral primary osteoarthritis of 
kneeFollow up:Follow up:   As needed



Functional Status







 Description

 

 No Information Available







Mental Status







 Description

 

 No Information Available







Referrals







 Description

 

 No Information Available

## 2020-01-07 NOTE — XMS REPORT
Continuity of Care Document (CCD)

 Created on:2019



Patient:Ortolani, Grace C

Sex:Female

:1950

External Reference #:[id number]





Demographics







 Address  [address] [place] [address]

 

 Home Phone  [phone]

 

 Mobile Phone  [phone]

 

 Email Address  [email or web address]

 

 Preferred Language  en

 

 Marital Status  Not  or 

 

 Druze Affiliation  Unknown

 

 Race  White

 

 Ethnic Group  Not  or 









Author







 Name  Trixie Albert MD

 

 Address  [address] [place] [address]









Care Team Providers







 Name  Role  Phone

 

 Maxi Valdivia MD  Care Team Information   [phone]

 

 [facility] - Diabetes  Care Team Information   [phone]
[phone]



 Educator    

 

 [facility]  Care Team Information   [phone]
[phone]

 

 Bandar Pérez MD - Neurology  Care Team Information   [phone]
[phone]

 

 Tip Mcgill MD - Surgery  Care Team Information   [phone]
[phone]

 

 Isidro Hernández DR - Surgery  Care Team Information   [phone]

 

 Ben Benitez MD -  Care Team Information   [phone]



 Gastroenterology    

 

 Barnett-Reyes, Saundra - Psychiatry  Care Team Information   [phone]
[phone]

 

 Freddy Mariee MD - Otolaryngology  Care Team Information   [phone]
[phone]









Problems







 Active Problems  Provider  Date

 

 Mixed hyperlipidemia  Trixie Albert MD  Onset: 2014

 

 Osteoporosis  Trixie Albert MD  Onset: 2014

 

 Impaired fasting glycaemia  Trixie Albetr MD  Onset: 2014

 

 Irritable bowel syndrome  Trixie Albert MD  Onset: 2013

 

 Gastroesophageal reflux disease  Trixie Albert MD  Onset: 2013

 

 Generalized anxiety disorder  Trixie Albert MD  Onset: 2013

 

 Vitamin D deficiency  Trixie Albert MD  Onset: 2013

 

 C-reactive protein abnormal  Trixie Albert MD  Onset: 2017

 

 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



 psychotic features    

 

 Thalassemia minor  Trixie Albert MD  Onset: 2018

 

 Chronic nonalcoholic liver disease  Trixie Albert MD  Onset: 2018

 

 Mild intermittent asthma  Trixie Albert MD  Onset: 2019







Social History







 Type  Date  Description  Comments

 

 Birth Sex    Unknown  

 

 Tobacco Use  Start: Unknown  Never Smoked Cigarettes  

 

 Smoking Status  Reviewed: 18  Never Smoked Cigarettes  

 

 ETOH Use    Denies alcohol use  

 

 Tobacco Use  Start: Unknown  Patient has never smoked  







Allergies, Adverse Reactions, Alerts







 Active Allergies  Reaction  Severity  Comments  Date

 

 Mobic  Nausea and Vomiting  Severe    2015

 

 Lyrica      Suicidal thoughts  2018

 

 Venlafaxine      Rash  2018









 Inactive Allergies









 NKDA        2015







Medications







 Active Medications  SIG  Qnty  Indications  Ordering  Date



         Provider  

 

 Naproxen  1 by mouth  60tabs    Trixie Albert,  2019



     500mg Tablets  twice a day for      MD  



   1-2 weeks as        



   needed for pain        

 

 Prochlorperazine Maleate  one tab tid as  30tabs  R21  Trixie Albert,  2018



                     10mg  needed for      MD  



 Tablets  nausea        

 

 Pantoprazole Sodium  1 by mouth  90tabs  K21.9  Trixie Albert,  2018



                40mg  every day      MD  



 Tablets DR          



           

 

 Hydroxyzine Pamoate  1 by mouth  60caps  L20.9  Trixie Albert,  2018



                25mg  every bedtime      MD  



 Capsules  for itching 1        



   during the day        



   if needed for        



   itching        









 F33.2









 Betamethasone  apply 1 thin  45units  R21  Trixie Albert MD  2018



 Dipropionate  application to arm        



          0.05% Cream  twice daily for 10        



   days as needed        









 L20.9









 Clobetasol Propionate  apply two times  30gm  R21  Sunny Juan MD  



   daily to affected        



 0.05% Cream  areas on arms and        



   legs        









 L20.9









 Lactase Enzyme  1 by mouth before  90tabs  K58.9  Trixie Albert MD  2018



           3000Unit  meals prn        



 Tablets          

 

 Loratadine  by mouth every day    J30.9  Trixie Albert MD  10/06/2017



       10mg Tablets  in morning as        



   needed        

 

 Ventolin HFA  inhale two puffs  8gm  J45.30  Trixie Albert MD  05/15/2015



         108(90Base)  by mouth every 4        



 mcg/Act Aerosol  hours as needed        



           

 

 [employer] Lub[employer]ating Eye  OU prn      Trixie Albert MD  2013



 Drops/Dry Eye          



          0.5-0.9%          



 Solution          



           

 

 Acetaminophen Extra  2 po q4h prn      Trixie Albert MD  2013



 Strength          



     500mg Tablets          



           

 

 Clonazepam  1 by mouth three    F41.1  Unknown  



       1mg Tablets  times a day        



           

 

 Trazodone HCL  take one tablet by    F33.2  Ornelas-Reyes,  



          100mg Tablets  mouth at bedtime      Kianna  



           

 

 Mirtazapine  1 tab PO QHS    F33.2  Unknown  



        15mg Tablets          



           

 

 Effexor XR  1 by mouth bid -    F33.2  Unknown  



       150mg Caps ER 24HR  Kandis as generic        



   caused a rash        

 

 Fluticasone Propionate  1 sprays both    J30.9  Unknown  



 Nasal Archer 24- Hour  nares a day        



           



 50mcg/Act Suspension          



           

 

 Ayr Saline Nasal  apply 1 inch by      Unknown  



              Gel  nasal route in        



   both nostrils        



   twice a day        

 

 Vitamin D  1 by mouth every      Unknown  



      1000Unit Tablets  day        



           







Immunizations







 CPT Code  Status  Date  Vaccine  Reaction  Lot #

 

 [id number]  Given  2019  Influenza Virus    



       Vaccine,Quadrivalent,Split,Prese    



       rv Free, 0.5mL,Im    

 

 85605  Given  2018  Pneumococcal 23 Immunization    [id number]



       Adult Or Immunosuppressed    



       Patient    

 

   Given  2018  Flu Vaccine NOS    

 

 35651  Given  2018  Tdap (Adacel) Ages 7 And Above    



       Only    

 

 21424  Given  2018  Prevnar 13 Pneumococal Conjugate    



       Vaccine    

 

 32385  Given  10/01/2017  Afluria Or Fluvirin Flu Vac    



       Intramuscular    

 

 50824  Given  2017  Prevnar 13 Pneumococal Conjugate  Pt tolerated well  
[id number]



       Vaccine    

 

   Given  10/25/2016  Flu Vaccine NOS    

 

 34179  Given  10/01/2015  Afluria Or Fluvirin Flu Vac    



       Intramuscular    

 

 16140  Given  2015  Zoster (Zostavax)    

 

 96853  Given  10/01/2014  Fluzone Highdose Age 65 And Over    



       Preservative & Antibiotic Free    

 

 10825  Given  10/14/2013  Afluria Or Fluvirin Flu Vac    



       Intramuscular    

 

 67074  Given  2010  Tdap (Adacel) Ages 7 And Above    



       Only    







Vital Signs







 Date  Vital  Result  Comment

 

 2019  1:59pm  Weight  189.00 lb  









 Heart Rate  77 /min  

 

 BP Systolic  134 mmHg  

 

 BP Diastolic  78 mmHg  

 

 Respiratory Rate  16 /min  

 

 Height  58.25 inches  4'10.25"

 

 O2 % BldC Oximetry  95 %  Ra

 

 BMI (Body Mass Index)  39.2 kg/m2  









 2019  4:07pm  Weight  188.00 lb  









 Heart Rate  76 /min  

 

 BP Systolic  122 mmHg  

 

 BP Diastolic  76 mmHg  

 

 Respiratory Rate  16 /min  

 

 Height  58.25 inches  4'10.25"

 

 O2 % BldC Oximetry  98 %  Ra

 

 BMI (Body Mass Index)  39.0 kg/m2  







Results







 Test  Acquired Date  Facility  Test  Result  H/L  Range  Note

 

 Hemoglobin A1c  2019  [facility]  Hemoglobin A1c  6.2 %  High  4.1-5.9  1









 Estimated Average Glucose Calc  131 mg/dL      









 Basic (BMP)  2019  [facility]  Sodium  142 mmol/L    135-146  2









 Potassium  4.2 mmol/L    3.5-5.2  

 

 Chloride#  107 mmol/L      3

 

 Carbon Dioxide  26 mmol/L    24-34  

 

 Glucose  144 mg/dL  High    

 

 BUN  16 mg/dL    6-26  

 

 Creatinine  0.6 mg/dL    0.5-1.4  

 

 Calcium  9.1 mg/dL    8.5-10.5  4

 

 Female Egfr  93    >60  5

 

 Male Egfr  102    >60  6

 

 Anion Gap  9 mmol/L    5-15  7









 1  3 mos

 

 2  Updated reference range on new analyzer 3-2017

 

 3  Updated reference range on new analyzer 3-2017

 

 4  Updated reference range 2019

 

 5  Concerning GFR Guidelines for  Americans:



   Normal function or mild renal disease, if clinically at risk:  >/= 60



   mL/min



   Moderately decreased:  30-59



   Severely decreased:  15-29



   Renal failure:  <15



   There is reduced accuracy above 60ml/min/1.73 m squared, but the



   numeric value may be clinically useful in the near 60 range

 

 6  Concerning GFR Guidelines:



   Normal function or mild renal disease, if clinically at risk:  >/= 60



   mL/min



   Moderately decreased:  30-59



   Severely decreased:  15-29



   Renal failure:  <15



   There is reduced accuracy above 60ml/min/1.73 m squared, but the



   numeric value may be clinically useful in the near 60 range



   



   Glomerular Filtration Rate (GFR) is estimated based on the CKD-EPI



   equation, which assumes a steady state for creatinine as recommended



   by the National Kidney Disease Education Program in conjunction with



   the National Institutes of Health and the National Kidney Foundation.



   



   Clinical conditions in which it may be necessary to measure GFR by



   using clearance methods include extremes of age and body size, severe



   malnutrition or obesity, diseases of skeletal muscle, paraplegia or



   quadriplegia, vegetarian diet, rapidly changing kidney function, and



   calculation of the dose of potentially toxic drugs that are excreted



   by the kidneys.

 

 7  Updated Reference Range 2-







Procedures







 Date  Code  Description  Status

 

 10/01/2019  717880395  Bone Mineral Density Test  Completed

 

 2019  77303056  Colonoscopy  Completed

 

 10/12/2017  323202853  Bone Mineral Density Test  Completed

 

 2015  48519587  Mammogram  Completed







Medical Devices







 Description

 

 No Information Available







Encounters







 Type  Date  Location  Provider  Dx  Diagnosis

 

 Office Visit  2019  4:00p  [facility]  Trixie Albert MD  E66.9  Obesity, 
unspecified









 Z00.00  Encntr for general adult medical exam w/o abnormal findings

 

 E78.2  Mixed hyperlipidemia

 

 M81.0  Age-related osteoporosis w/o current pathological fracture

 

 R73.01  Impaired fasting glucose

 

 K58.0  Irritable bowel syndrome with diarrhea

 

 K21.9  Gastro-esophageal reflux disease without esophagitis

 

 F41.1  Generalized anxiety disorder

 

 E55.9  Vitamin D deficiency, unspecified

 

 R79.82  Elevated C-reactive protein (CRP)

 

 F33.2  Major depressv disorder, recurrent severe w/o psych features

 

 D56.3  Thalassemia minor

 

 K76.0  Fatty (change of) liver, not elsewhere classified

 

 J45.20  Mild intermittent asthma, uncomplicated

 

 Z13.31  Encounter for screening for depression

 

 Z68.39  Body mass index (BMI) 39.0-39.9, adult









 Office Visit  2019  4:15p  [facility]  Nadya Dubose PA  R22.2  Localized 
swelling, mass



           and lump, trunk









 Z68.38  Body mass index (BMI) 38.0-38.9, adult







Assessments







 Date  Code  Description  Provider

 

 2019  M54.6  Pain in thoracic spine  Trixie Albert MD

 

 2019  E66.9  Obesity, unspecified  Trixie Albert MD

 

 2019  Z00.00  Encounter for general adult medical  Trixie Albert MD



     examination without abnormal findings  

 

 2019  E78.2  Mixed hyperlipidemia  Trixie Albert MD

 

 2019  M81.0  Osteoporosis  Trixie Albert MD

 

 2019  R73.01  Impaired fasting glycaemia  Trixie Albert MD

 

 2019  K58.0  Irritable bowel syndrome  Trixie Albert MD

 

 2019  K21.9  Gastroesophageal reflux disease  Trixie Albert MD

 

 2019  F41.1  Generalized anxiety disorder  Trixie Albert MD

 

 2019  E55.9  Vitamin D deficiency  Trixie Albert MD

 

 2019  R79.82  Elevated C-reactive protein (CRP)  Trixie Albert MD

 

 2019  F33.2  Severe recurrent major depression without  Trixie Albert MD



     psychotic features  

 

 2019  D56.3  Thalassemia minor  Trixie Albert MD

 

 2019  K76.0  Chronic nonalcoholic liver disease  Trixie Albert MD

 

 2019  J45.20  Mild intermittent asthma, uncomplicated  Trixie Albert MD

 

 2019  Z13.31  Encounter for screening for depression  Trixie Albert MD

 

 2019  Z68.39  Body mass index (BMI) 39.0-39.9, adult  Trixie Albert MD

 

 2019  R73.01  Impaired fasting glucose  Trixie Albert MD

 

 2019  R73.01  Impaired fasting glucose  Schedule, Laboratory

 

 2019  R73.01  Impaired fasting glucose  [facility]

 

 2019  R22.2  Localized swelling, mass and lump, trunk  Nadya Dubose PA

 

 2019  Z68.38  Body mass index (BMI) 38.0-38.9, adult  Nadya Dubose PA







Plan of Treatment

Future Appointment(s):2020  4:00 pm - Trixie Albert MD at [facility]2020
  3:00 pm - Schedule, Laboratory at [facility]2019 - Trixie Albert MDM54.6 
Pain in thoracic spineNew Orders:Physical Therapy, Ordered: 19Comments:
suspect posture is causing this problem - check xray of thoracic spine to rule 
out compression fracture.   She was recommended to take Tylenol or ibuprofen as 
needed for her pain.  She was advised to apply heating pad or ice to the 
affected area.  The patient will be referred to physical therapy.Follow up:
Follow up as scheduled



Functional Status







 Description

 

 No Information Available







Mental Status







 Description

 

 No Information Available







Referrals







 Refer to   Reason for Referral  Status  Appt Date

 

 Isidro Hernández DR  Lipoma on back, causing pain  Closed  2019









 [address] [place] [address]

 

 [phone]

 

 









 Freddy Mariee MD  evaluation and treat "plugged ear"  Patient Declined  









 [address] [id number] [phone]

## 2025-07-01 NOTE — UC
Complaint Female HPI





- HPI Summary


HPI Summary: 


69 year old female with urinary urgency, frequency for 2 weeks, burning with 

urination x 24 hours.  Patient has fecal incontinence, wears pads/ depends.   

Lower back pain x several days, no flank pain.    NO fever, chills.   also 

concerned about DM- tried to contact PCP about Ha1C, unable to get a hold of 

them.    








- History Of Current Complaint


Chief Complaint: UCGU


Stated Complaint: URINARY


Time Seen by Provider: 01/07/20 15:06


Hx Obtained From: Patient


Hx Last Menstrual Period: N/A


Pregnant?: No


Onset/Duration: Sudden Onset, Lasting Days


Timing: Constant


Severity Initially: Mild


Severity Currently: Mild


Pain Intensity: 0


Pain Scale Used: 0-10 Numeric


Character: Burning - with urination


Associated Signs And Symptoms: Positive: Back Pain.  Negative: Fever, Vaginal 

Bleeding/Discharge, Vaginal Discharge, Nausea, Vomiting(# Of Episodes =), 

Genital Swelling, Genital Blisters





- Allergies/Home Medications


Allergies/Adverse Reactions: 


 Allergies











Allergy/AdvReac Type Severity Reaction Status Date / Time


 


Adhesive Tape Allergy  ITCHINESS Verified 01/07/20 14:37


 


meloxicam Allergy  Nausea And Verified 01/07/20 14:37





   Vomiting  


 


pregabalin [From Lyrica] Allergy  depression Verified 01/07/20 14:37


 


ENVIRONMENTAL Allergy  Unknown Uncoded 01/07/20 14:37





   Reaction  





   Details  














PMH/Surg Hx/FS Hx/Imm Hx


Previously Healthy: Yes


Endocrine History: Diabetes - pre-DM





- Surgical History


Surgical History: Yes


Surgery Procedure, Year, and Place: C-SECTIONS 2-RIGHT HAND CARPAL TUNNEL. 

BILATERAL OVARIES REMOVED.  T&A.  D&Cs.  BILATERAL BREAST LUMPECTOMIES.  LASER 

SX L EYE-FLOATERS.  SURGERIES  IN [facility]





- Family History


Known Family History: Positive: Cardiac Disease, Hypertension, Respiratory 

Disease


   Negative: Diabetes, Seizure Disorder





- Social History


Alcohol Use: None


Substance Use Type: None


Smoking Status (MU): Never Smoked Tobacco


Have You Smoked in the Last Year: No





- Immunization History


Most Recent Influenza Vaccination: NONE 2017


Most Recent Tetanus Shot: 2010


Most Recent Pneumonia Vaccination: Sep 2017


Vaccination Up to Date: Yes





Review of Systems


All Other Systems Reviewed And Are Negative: Yes


Constitutional: Negative: Fever, Chills, Fatigue


Respiratory: Negative: Cough


Gastrointestinal: Positive: Abdominal Pain


Genitourinary: Positive: Dysuria, Frequency, Urgency.  Negative: Vaginal/Penile 

Burning, Vaginal/Penile Itching, Vaginal/Penile Discharge, Vaginal/Penile Pain, 

Vaginal/Penile Tenderness


Musculoskeletal: Positive: Myalgia


Is Patient Immunocompromised?: No





Physical Exam


Triage Information Reviewed: Yes


Appearance: Well-Appearing, No Pain Distress, Well-Nourished


Vital Signs: 


 Initial Vital Signs











Temp  97.8 F   01/07/20 14:39


 


Pulse  78   01/07/20 14:39


 


Resp  17   01/07/20 14:39


 


BP  142/74   01/07/20 14:39


 


Pulse Ox  97   01/07/20 14:39











Vital Signs Reviewed: Yes


Eyes: Positive: Conjunctiva Clear


ENT: Positive: Hearing grossly normal


Respiratory: Positive: Chest non-tender, Lungs clear, Normal breath sounds, No 

respiratory distress, No accessory muscle use.  Negative: Crackles, Rhonchi, 

Stridor, Wheezing


Cardiovascular: Positive: RRR, No Murmur


Abdomen Description: Positive: Nontender, No Organomegaly, Soft.  Negative: CVA 

Tenderness (R), CVA Tenderness (L), Distended, Guarding


Musculoskeletal Exam: Normal


Skin Exam: Normal





 Complaint Female Dx





- Course


Course Of Treatment: 


Probable UTI;  loose stools 





- Cultures sent from urine, will get results within 1-2 days, will be called 

with abnormal results. 


- Stool cultures sent, please send results to Dr. Castaneda for follow up 


- INcrease fluid intake 


- Motrin/ tylenol as needed for pain 


- ANtibiotics as directed 








- Differential Dx/Diagnosis


Differential Diagnosis/HQI/PQRI: Urinary Tract Infection


Provider Diagnosis: 


 UTI (urinary tract infection)








Discharge ED





- Sign-Out/Discharge


Documenting (check all that apply): Patient Departure


All imaging exams completed and their final reports reviewed: No Studies





- Discharge Plan


Condition: Good


Disposition: HOME


Prescriptions: 


Nitrofurantoin Monohyd/M-Cryst [Macrobid 100 mg Capsule] 100 mg PO BID #10 cap


Patient Education Materials:  Urinary Tract Infection in Women (DC)


Referrals: 


Trixie Albert MD [Primary Care Provider] - 


Additional Instructions: 


- Cultures sent from urine, will get results within 1-2 days, will be called 

with abnormal results. 


- Stool cultures sent, please send results to Dr. Castaneda for follow up 


- INcrease fluid intake 


- Motrin/ tylenol as needed for pain 


- ANtibiotics as directed 











- Billing Disposition and Condition


Condition: GOOD


Disposition: Home
- Progress Note


Progress Note: 





stool: 


neg shiga toxon


neg lactoferrin


no change


francia








Course/Dx





- Diagnoses


Provider Diagnoses: 


 UTI (urinary tract infection)








Discharge ED





- Sign-Out/Discharge


Documenting (check all that apply): Post-Discharge Follow Up


All imaging exams completed and their final reports reviewed: No Studies





- Discharge Plan


Condition: Good


Disposition: HOME


Prescriptions: 


Nitrofurantoin Monohyd/M-Cryst [Macrobid 100 mg Capsule] 100 mg PO BID #10 cap


Patient Education Materials:  Urinary Tract Infection in Women (DC)


Referrals: 


Trixie Albert MD [Primary Care Provider] - 


Additional Instructions: 


- Cultures sent from urine, will get results within 1-2 days, will be called 

with abnormal results. 


- Stool cultures sent, please send results to Dr. Castaneda for follow up 


- INcrease fluid intake 


- Motrin/ tylenol as needed for pain 


- ANtibiotics as directed 











- Billing Disposition and Condition


Condition: GOOD


Disposition: Home
None needed